# Patient Record
Sex: MALE | Race: BLACK OR AFRICAN AMERICAN
[De-identification: names, ages, dates, MRNs, and addresses within clinical notes are randomized per-mention and may not be internally consistent; named-entity substitution may affect disease eponyms.]

---

## 2017-01-08 ENCOUNTER — HOSPITAL ENCOUNTER (EMERGENCY)
Dept: HOSPITAL 17 - PHEFT | Age: 62
Discharge: HOME | End: 2017-01-08
Payer: MEDICARE

## 2017-01-08 VITALS — WEIGHT: 187.39 LBS | HEIGHT: 66 IN | BODY MASS INDEX: 30.12 KG/M2

## 2017-01-08 VITALS
SYSTOLIC BLOOD PRESSURE: 119 MMHG | TEMPERATURE: 97.9 F | HEART RATE: 60 BPM | OXYGEN SATURATION: 97 % | RESPIRATION RATE: 16 BRPM | DIASTOLIC BLOOD PRESSURE: 85 MMHG

## 2017-01-08 DIAGNOSIS — S00.451A: Primary | ICD-10-CM
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
## 2017-01-08 DIAGNOSIS — F10.10: ICD-10-CM

## 2017-01-08 DIAGNOSIS — Z23: ICD-10-CM

## 2017-01-08 DIAGNOSIS — Z79.01: ICD-10-CM

## 2017-01-08 DIAGNOSIS — F12.20: ICD-10-CM

## 2017-01-08 DIAGNOSIS — I10: ICD-10-CM

## 2017-01-08 DIAGNOSIS — F17.210: ICD-10-CM

## 2017-01-08 DIAGNOSIS — E78.00: ICD-10-CM
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
## 2017-01-08 DIAGNOSIS — E11.9: ICD-10-CM

## 2017-01-08 PROCEDURE — 10120 INC&RMVL FB SUBQ TISS SMPL: CPT

## 2017-01-08 PROCEDURE — 90471 IMMUNIZATION ADMIN: CPT

## 2017-01-08 PROCEDURE — 90714 TD VACC NO PRESV 7 YRS+ IM: CPT

## 2017-01-08 NOTE — PD
HPI


Chief Complaint:  Foreign Body


Time Seen by Provider:  18:24


Travel History


International Travel<30 days:  No


Contact w/Intl Traveler<30days:  No


Traveled to known affect area:  No





History of Present Illness


HPI


Patient's 61-year-old male with a history of diabetes mellitus presents to 

emergency department today with right ear pain for the past few days.  Patient 

states that he wears a hearing in his right ear lobe and that he is unable to 

get the back of the earring out.  His company by his significant other who 

states she is trying to get it out as well and has noticed some foul smell from 

the ears well.  No erythema no fevers and feeling well generally.  Patient 

states this never happened him before.  Denies any nausea vomiting diarrhea 

headache





PFSH


Past Medical History


High Cholesterol:  Yes


Cerebrovascular Accident:  Yes (2001 and 2003)


Diabetes:  Yes


Patient Takes Glucophage:  Yes


Diminished Hearing:  No


GERD:  Yes


Hypertension:  Yes


Seizures:  Yes





Past Surgical History


Other Surgery:  Yes (2001,"left frontal craniotomy")





Social History


Alcohol Use:  Yes (BEER OR WINE COUPLE TIMES/WEEK)


Tobacco Use:  Yes (1/2 PPD)


Substance Use:  Yes (MARIJUANA DAILY)





Allergies-Medications


(Allergen,Severity, Reaction):  


Coded Allergies:  


     No Known Allergies (Verified , 1/8/17)


Reported Meds & Prescriptions





Reported Meds & Active Scripts


Active


Bactrim DS (Sulfamethoxazole-Trimethoprim) 800-160 Mg Tab 2 Tab PO BID 7 Days


Reported


Omeprazole 20 Mg Tab 20 Mg PO DAILY


Lisinopril 10 Mg Tab 10 Mg PO DAILY


Amlodipine (Amlodipine Besylate) 10 Mg Tab 10 Mg PO DAILY


Carvedilol 3.125 Mg Tab 3.125 Mg PO BID


Glipizide 5 Mg Tab 5 Mg PO BIDAC


     Take 30 minutes before a meal


Metformin (Metformin HCl) 1,000 Mg Tab 1,000 Mg PO BIDPC


     With meals


Levetiracetam 750 Mg Tab 750 Mg PO BID








Review of Systems


Except as stated in HPI:  all other systems reviewed are Neg





Physical Exam


Narrative


GENERAL: Well-nourished, well-developed patient in no apparent distress.


SKIN: Warm and dry.


HEAD: Normocephalic.


EYES: No scleral icterus. No injection or drainage. 


ENT: Throat clear oropharynx moist.  TMs clear bilaterally.  Left auricle 

normal.  The right auricle shows some minimal discharge from the posterior 

fenestration of the piercing.  An earring is in place, minimally tender to 

palpation.  No surrounding erythema.  There is some mild edema.  There is some 

small amount of foul-smelling discharge.  The back of the earring appears to be 

just underneath the skin.


NECK: Supple, trachea midline. No JVD or lymphadenopathy.


CARDIOVASCULAR: Regular rate and rhythm without murmurs, gallops, or rubs. 


RESPIRATORY: Breath sounds equal bilaterally. No accessory muscle use.


GASTROINTESTINAL: Abdomen soft, non-tender, nondistended. 


MUSCULOSKELETAL: No cyanosis, or edema. 


BACK: Nontender without obvious deformity. No CVA tenderness.





Data


Data


Last Documented VS





Vital Signs








  Date Time  Temp Pulse Resp B/P Pulse Ox O2 Delivery O2 Flow Rate FiO2


 


1/8/17 18:02 97.9 60 16 119/85 97   








Orders





 Lidocaine 1% Inj (50 Ml) (Xylocaine 1% I (1/8/17 18:15)


Tetanus/Diphtheria Tox Adult (Tetanus/Di (1/8/17 18:45)








MDM


Medical Decision Making


Medical Screen Exam Complete:  Yes


Emergency Medical Condition:  Yes


Differential Diagnosis


Retained foreign body, earlobe infection, abscess unlikely, cellulitis.


Narrative Course


Patient roomed in emergency department, tetanus was updated, foreign body was 

removed with minimal difficulty.  Discussed and medical management home, 

recommend following up with primary care physician.  No earrings until seen by 

primary care physician.  We'll place on antibiotics.





Procedures


**Procedure Narrative**


FOREIGN BODY REMOVAL: After verbal consent, the patient had a partial a regular 

block with 1% lidocaine to a total of 3 cc.  The outer portion of the earring 

was removed, confirming there is still foreign body in his ear lobe with some 

manual expression the earring back was able to be expressed partially, was 

clamped for septum removed.  There was minimal bleeding and minimal trauma to 

the skin.  No further discharge was expressed.  Hemostasis was achieved with 

gentle pressure from a few seconds and blood loss was less than 5 cc.  Patient 

tolerated the procedure well.





Diagnosis





 Primary Impression:  


 Foreign body of skin of ear region


 Qualified Code:  S00.451A - Foreign body of skin of ear region, right, initial 

encounter


***Med/Other Pt SpecificInfo:  Prescription(s) given


Scripts


Sulfamethoxazole-Trimethoprim (Bactrim DS)800-160 Mg Tab2 Tab PO BID  7 Days  

Ref 0


   Prov:Flaquito Jacobson MD         1/8/17


Disposition:  01 DISCHARGE HOME


Condition:  Stable








Flaquito Jacobson MD Jan 8, 2017 18:45

## 2017-12-06 ENCOUNTER — HOSPITAL ENCOUNTER (OUTPATIENT)
Dept: HOSPITAL 17 - HRIP | Age: 62
Discharge: HOME | End: 2017-12-06
Attending: INTERNAL MEDICINE
Payer: MEDICARE

## 2017-12-06 VITALS
HEART RATE: 65 BPM | DIASTOLIC BLOOD PRESSURE: 81 MMHG | SYSTOLIC BLOOD PRESSURE: 119 MMHG | RESPIRATION RATE: 18 BRPM | OXYGEN SATURATION: 93 %

## 2017-12-06 VITALS
SYSTOLIC BLOOD PRESSURE: 122 MMHG | RESPIRATION RATE: 16 BRPM | HEART RATE: 67 BPM | DIASTOLIC BLOOD PRESSURE: 76 MMHG | OXYGEN SATURATION: 94 %

## 2017-12-06 VITALS
SYSTOLIC BLOOD PRESSURE: 128 MMHG | RESPIRATION RATE: 16 BRPM | OXYGEN SATURATION: 99 % | DIASTOLIC BLOOD PRESSURE: 88 MMHG | HEART RATE: 60 BPM

## 2017-12-06 VITALS
OXYGEN SATURATION: 94 % | TEMPERATURE: 98.2 F | DIASTOLIC BLOOD PRESSURE: 91 MMHG | HEART RATE: 68 BPM | RESPIRATION RATE: 20 BRPM | SYSTOLIC BLOOD PRESSURE: 127 MMHG

## 2017-12-06 VITALS
SYSTOLIC BLOOD PRESSURE: 125 MMHG | HEART RATE: 66 BPM | RESPIRATION RATE: 18 BRPM | DIASTOLIC BLOOD PRESSURE: 70 MMHG | OXYGEN SATURATION: 95 %

## 2017-12-06 VITALS
HEART RATE: 69 BPM | DIASTOLIC BLOOD PRESSURE: 75 MMHG | OXYGEN SATURATION: 92 % | SYSTOLIC BLOOD PRESSURE: 113 MMHG | TEMPERATURE: 97.3 F | RESPIRATION RATE: 18 BRPM

## 2017-12-06 VITALS
DIASTOLIC BLOOD PRESSURE: 67 MMHG | OXYGEN SATURATION: 95 % | RESPIRATION RATE: 18 BRPM | HEART RATE: 60 BPM | SYSTOLIC BLOOD PRESSURE: 138 MMHG

## 2017-12-06 VITALS
OXYGEN SATURATION: 94 % | SYSTOLIC BLOOD PRESSURE: 117 MMHG | RESPIRATION RATE: 18 BRPM | DIASTOLIC BLOOD PRESSURE: 78 MMHG | HEART RATE: 81 BPM

## 2017-12-06 VITALS — WEIGHT: 190.48 LBS | HEIGHT: 66 IN | BODY MASS INDEX: 30.61 KG/M2

## 2017-12-06 DIAGNOSIS — K21.9: ICD-10-CM

## 2017-12-06 DIAGNOSIS — I10: ICD-10-CM

## 2017-12-06 DIAGNOSIS — E11.9: ICD-10-CM

## 2017-12-06 DIAGNOSIS — R16.0: Primary | ICD-10-CM

## 2017-12-06 DIAGNOSIS — B19.20: ICD-10-CM

## 2017-12-06 DIAGNOSIS — Z86.73: ICD-10-CM

## 2017-12-06 DIAGNOSIS — K74.60: ICD-10-CM

## 2017-12-06 PROCEDURE — 88342 IMHCHEM/IMCYTCHM 1ST ANTB: CPT

## 2017-12-06 PROCEDURE — 77012 CT SCAN FOR NEEDLE BIOPSY: CPT

## 2017-12-06 PROCEDURE — 88341 IMHCHEM/IMCYTCHM EA ADD ANTB: CPT

## 2017-12-06 PROCEDURE — 88307 TISSUE EXAM BY PATHOLOGIST: CPT

## 2017-12-06 PROCEDURE — 88333 PATH CONSLTJ SURG CYTO XM 1: CPT

## 2017-12-06 PROCEDURE — 47000 NEEDLE BIOPSY OF LIVER PERQ: CPT

## 2017-12-06 NOTE — RADRPT
EXAM DATE/TIME:  12/06/2017 12:03 

 

[facility] COMPARISON:     

No previous studies available for comparison.

 

 

 

INDICATIONS :      

Liver mass.

                     

 

 

 

SEDATION TIME:       

20 minutes

 

 

BIOPSY SITE:       

Right upper quadrant.

                     

 

MEDICATION(S):

1.) 1 mg midazolam (Versed) IV  

2.) 50 mcg fentanyl (Sublimaze) IV  

 

 

DEVICE(S):

1.) 18 gauge Temno core biopsy needle 

                     

 

MEDICAL HISTORY :     

Cirrhosis. Hepatitis C. Gastroesophageal reflux disease. Damon's esophagus, diabetes, hypertension,
 seizures, stroke. 

 

SURGICAL HISTORY :     

None.   

 

ENCOUNTER:     

Initial

 

ACUITY:     

1 day

 

PAIN SCORE:     

0/10

 

LOCATION:     

Right upper quadrant

                     

A total of two core specimen(s) were obtained and sent to the laboratory for pathologic evaluation.

 

 

PROCEDURE:

1.   CT guided liver biopsy.

 

Prior to the procedure informed consent was obtained.  Any appropriate prior imaging studies were rev
iewed. 

Using automated exposure control and adjustment of the mA and/or kV according to patient size, radiat
ion dose was kept as low as reasonably achievable to obtain optimal diagnostic quality images.  DICOM
 format image data is available electronically for review and comparison.   

 

 

The site was prepped in a sterile fashion.  Full sterile technique was used, including cap, mask, susan
rile gloves and gown and a large sterile sheet.  Hand hygiene and 2% chlorhexidine and/or betadine/al
cohol prep was utilized per protocol for cutaneous antisepsis.  The skin and subcutaneous tissues wer
e infiltrated with local anesthetic solution.

 

With CT guidance the previously identified target was localized. Biopsy was performed using the presc
ribed needle as above.  First biopsy was passed to the [employer]. A few abnormal cells were identified 
from the first touch prep. To insure adequate material for diagnosis, a second biopsy was performed a
nd placed directly in formalin. Adequate hemostasis was obtained with compression at the puncture sit
e.

 

Follow-up CT scan reveals no hemorrhage. No pneumothorax.

 

The patient tolerated the procedure well and there were no complications. The patient was returned to
 the Radiology Outpatient Unit in stable condition.

 

CONCLUSION:     Uncomplicated CT guided biopsy.

 

 

 

 Kelechi Stephenson MD on December 06, 2017 at 15:53           

Board Certified Radiologist.

 This report was verified electronically.

## 2017-12-06 NOTE — PD.RAD
Post CT Procedure Prog Note


Pre Procedure Diagnosis:  


(1) Liver mass


Post Procedure Diagnosis:  


(1) Liver mass


Procedure Date:


Dec 6, 2017


Supervising Radiologist:


Kelechi Stephenson


Anesthesia:  Local, Analgesia, Conscious Sedation


Plan of Activity


Patient to Unit:  [facility]


Patient Condition:  Good


See PACS Report for procedural detail/treatment





Biopsy


Imaging Guidance:  CT


Biopsy Procedure:  Liver (dome)


Specimen:  Core Biopsy (x 2)


Findings:


Touch prep on first core.  [employer] described scant cells.  Second bx obtained.











Kelechi Stephenson MD Dec 6, 2017 12:40

## 2017-12-18 ENCOUNTER — HOSPITAL ENCOUNTER (OUTPATIENT)
Dept: HOSPITAL 17 - HROP | Age: 62
Discharge: HOME | End: 2017-12-18
Attending: INTERNAL MEDICINE
Payer: MEDICARE

## 2017-12-18 VITALS
OXYGEN SATURATION: 95 % | SYSTOLIC BLOOD PRESSURE: 144 MMHG | RESPIRATION RATE: 20 BRPM | HEART RATE: 82 BPM | DIASTOLIC BLOOD PRESSURE: 92 MMHG

## 2017-12-18 VITALS
RESPIRATION RATE: 20 BRPM | TEMPERATURE: 97.6 F | SYSTOLIC BLOOD PRESSURE: 132 MMHG | DIASTOLIC BLOOD PRESSURE: 88 MMHG | HEART RATE: 79 BPM | OXYGEN SATURATION: 97 %

## 2017-12-18 VITALS
HEART RATE: 69 BPM | RESPIRATION RATE: 20 BRPM | OXYGEN SATURATION: 98 % | SYSTOLIC BLOOD PRESSURE: 117 MMHG | DIASTOLIC BLOOD PRESSURE: 76 MMHG

## 2017-12-18 VITALS
HEART RATE: 70 BPM | DIASTOLIC BLOOD PRESSURE: 76 MMHG | SYSTOLIC BLOOD PRESSURE: 103 MMHG | RESPIRATION RATE: 20 BRPM | OXYGEN SATURATION: 97 %

## 2017-12-18 VITALS
DIASTOLIC BLOOD PRESSURE: 91 MMHG | TEMPERATURE: 98.2 F | SYSTOLIC BLOOD PRESSURE: 129 MMHG | HEART RATE: 78 BPM | RESPIRATION RATE: 20 BRPM | OXYGEN SATURATION: 95 %

## 2017-12-18 VITALS
HEART RATE: 76 BPM | RESPIRATION RATE: 20 BRPM | DIASTOLIC BLOOD PRESSURE: 75 MMHG | SYSTOLIC BLOOD PRESSURE: 114 MMHG | OXYGEN SATURATION: 96 %

## 2017-12-18 VITALS — WEIGHT: 191.36 LBS | BODY MASS INDEX: 30.75 KG/M2 | HEIGHT: 66 IN

## 2017-12-18 DIAGNOSIS — E78.00: ICD-10-CM

## 2017-12-18 DIAGNOSIS — E11.9: ICD-10-CM

## 2017-12-18 DIAGNOSIS — I10: ICD-10-CM

## 2017-12-18 DIAGNOSIS — Z86.73: ICD-10-CM

## 2017-12-18 DIAGNOSIS — K21.9: ICD-10-CM

## 2017-12-18 DIAGNOSIS — C18.9: Primary | ICD-10-CM

## 2017-12-18 DIAGNOSIS — C78.7: ICD-10-CM

## 2017-12-18 PROCEDURE — 76937 US GUIDE VASCULAR ACCESS: CPT

## 2017-12-18 PROCEDURE — 36561 INSERT TUNNELED CV CATH: CPT

## 2017-12-18 PROCEDURE — 99152 MOD SED SAME PHYS/QHP 5/>YRS: CPT

## 2017-12-18 PROCEDURE — 77001 FLUOROGUIDE FOR VEIN DEVICE: CPT

## 2017-12-18 PROCEDURE — 99153 MOD SED SAME PHYS/QHP EA: CPT

## 2017-12-18 PROCEDURE — C1788 PORT, INDWELLING, IMP: HCPCS

## 2017-12-18 NOTE — PD.RAD
Post Procedure Progress Note


Pre Procedure Diagnosis:  


(1) Liver mass


(2) Colon cancer metastasized to liver


Post Procedure Diagnosis:  


(1) Liver mass


(2) Colon cancer metastasized to liver


Procedure Date:


Dec 18, 2017


Supervising Radiologist:


Kelechi Stephenson


Proceduralist/Assist:  Kenneth Lopez, RT(R), Randee Anderson RT(R)


Anesthesia:  Local, Analgesia, Conscious Sedation


Plan of Activity


Patient to Unit:  [facility]


Patient Condition:  Good


See PACS Report for procedural detail/treatment





Central Venous Access Device


Procedure 1


Right


Internal Jugular


Infusaport


Placement


single lumen


English:  8











Kelechi Stephenson MD Dec 18, 2017 10:43

## 2017-12-18 NOTE — RADRPT
EXAM DATE/TIME:  12/18/2017 09:02 

 

[facility] COMPARISON:  

No previous studies available for comparison.

                     

 

INDICATIONS :               

Patient presents with colon cancer in need of port placement. 

                     

 

MEDICAL HISTORY :     

High Cholesterol

GERD

Diabetes

HTN

Sezure

CVA

 

SURGICAL HISTORY :     

Left Frontal Craneotomy 2001

 

ENCOUNTER:     

Initial

 

ACUITY:     

Subacute

 

PAIN SCORE:     

0/10

                     

 

FLUORO TIME:     

.3 minutes

 

IMAGE SERIES:      

1

 

SEDATION TIME:       

30 minutes

                     

 

ACCESS:     

Right internal jugular vein 

 

SEDATION:      

1.)  3 mg midazolam (Versed)  IV     

2.)  150 mcg fentanyl (Sublimaze)  IV     

      

Prophylactic antibiotics were administered with appropriate pre-procedure timing.     

Vancomycin within 2 hours of procedure, Ancef (or alternative) within 1 hour of procedure.     

      

 

DEVICE:      

1.  8 [place] single lumen  cm [id number]      

 

 

PROCEDURE :     

1.  Continuous pulse oximetry and EKG monitoring.

2.  Intravenous conscious sedation.

3.  Ultrasound guidance for venous access.

4.  Fluoroscopic guided implantable central venous port placement.

 

The patient was placed supine. The neck was prepped in sterile fashion.  Full sterile technique was u
sed, including cap, mask, sterile gloves and gown, and a large sterile sheet.  Hand hygiene and 2% ch
lorhexidine Betadine was utilized per protocol for cutaneous antisepsis with appropriate dry time for
 site.  Sterile gel and sterile probe cover were utilized for ultrasound guidance.   The skin and sub
cutaneous tissues were infiltrated with local anesthetic solution.  

 

Under direct ultrasound guidance, central venous access was accomplished in the targeted vessel.  The
 ultrasound images depicting access guidance were stored and saved to PACS for permanent record.  A s
ubcutaneous pocket was created using blunt dissection.  The port was introduced to the pocket.  The c
atheter tubing was fed through a subcutaneous tunnel to the venotomy site.  The catheter tubing was c
ut to a suitable length and then was introduced through a valved Peel-Away sheath and positioned with
 catheter tubing tip at the cavo-atrial junction level.  The pocket incision was closed with subcutic
ular Vicryl suture.  Steri-Strips were applied.  The port was flushed and locked with heparin solutio
n per protocol.  Sterile dressing was applied to the site.  The patient tolerated the procedure well.


 

Conscious sedation was performed with the prescribed dosages and duration as above in the presence of
 an independent trained radiology nurse to assist in the monitoring of the patient.  EKG and oximetry
 remained stable throughout the procedure. The patient tolerated the procedure well and there were no
 complications. The patient was sent to post anesthesia recovery in stable condition.

 

CONCLUSION:     

Uncomplicated ultrasound and fluoroscopic guided implanted central venous port catheter placement as 
described in detail above.  An 8 [place] Power port was placed.

 

 

 

 Kelechi Stephenson MD on December 18, 2017 at 13:13           

Board Certified Radiologist.

 This report was verified electronically.

## 2018-03-07 ENCOUNTER — HOSPITAL ENCOUNTER (INPATIENT)
Dept: HOSPITAL 17 - HSDI | Age: 63
LOS: 7 days | Discharge: HOME | DRG: 407 | End: 2018-03-14
Attending: SURGERY | Admitting: SURGERY
Payer: MEDICARE

## 2018-03-07 VITALS
TEMPERATURE: 99 F | DIASTOLIC BLOOD PRESSURE: 80 MMHG | SYSTOLIC BLOOD PRESSURE: 141 MMHG | OXYGEN SATURATION: 99 % | HEART RATE: 76 BPM | RESPIRATION RATE: 12 BRPM

## 2018-03-07 VITALS — WEIGHT: 200.62 LBS | BODY MASS INDEX: 32.24 KG/M2 | HEIGHT: 66 IN

## 2018-03-07 VITALS
SYSTOLIC BLOOD PRESSURE: 159 MMHG | DIASTOLIC BLOOD PRESSURE: 90 MMHG | TEMPERATURE: 99.4 F | HEART RATE: 80 BPM | OXYGEN SATURATION: 98 % | RESPIRATION RATE: 13 BRPM

## 2018-03-07 VITALS — HEART RATE: 69 BPM

## 2018-03-07 VITALS — HEART RATE: 76 BPM

## 2018-03-07 VITALS — HEART RATE: 68 BPM

## 2018-03-07 DIAGNOSIS — K21.9: ICD-10-CM

## 2018-03-07 DIAGNOSIS — I12.9: ICD-10-CM

## 2018-03-07 DIAGNOSIS — Z85.038: ICD-10-CM

## 2018-03-07 DIAGNOSIS — R31.9: ICD-10-CM

## 2018-03-07 DIAGNOSIS — F12.90: ICD-10-CM

## 2018-03-07 DIAGNOSIS — F17.210: ICD-10-CM

## 2018-03-07 DIAGNOSIS — R56.9: ICD-10-CM

## 2018-03-07 DIAGNOSIS — E87.6: ICD-10-CM

## 2018-03-07 DIAGNOSIS — E78.5: ICD-10-CM

## 2018-03-07 DIAGNOSIS — B19.20: ICD-10-CM

## 2018-03-07 DIAGNOSIS — Z79.84: ICD-10-CM

## 2018-03-07 DIAGNOSIS — I69.319: ICD-10-CM

## 2018-03-07 DIAGNOSIS — I10: ICD-10-CM

## 2018-03-07 DIAGNOSIS — E11.9: ICD-10-CM

## 2018-03-07 DIAGNOSIS — C78.7: Primary | ICD-10-CM

## 2018-03-07 DIAGNOSIS — K81.1: ICD-10-CM

## 2018-03-07 DIAGNOSIS — N18.9: ICD-10-CM

## 2018-03-07 LAB
ALBUMIN SERPL-MCNC: 3.2 GM/DL (ref 3.4–5)
ALP SERPL-CCNC: 87 U/L (ref 45–117)
ALT SERPL-CCNC: 31 U/L (ref 12–78)
AST SERPL-CCNC: 33 U/L (ref 15–37)
BASOPHILS # BLD AUTO: 0 TH/MM3 (ref 0–0.2)
BASOPHILS NFR BLD: 0.8 % (ref 0–2)
BILIRUB SERPL-MCNC: 0.4 MG/DL (ref 0.2–1)
BUN SERPL-MCNC: 19 MG/DL (ref 7–18)
CALCIUM SERPL-MCNC: 8.7 MG/DL (ref 8.5–10.1)
CHLORIDE SERPL-SCNC: 107 MEQ/L (ref 98–107)
CREAT SERPL-MCNC: 1.39 MG/DL (ref 0.6–1.3)
EOSINOPHIL # BLD: 0.1 TH/MM3 (ref 0–0.4)
EOSINOPHIL NFR BLD: 2 % (ref 0–4)
ERYTHROCYTE [DISTWIDTH] IN BLOOD BY AUTOMATED COUNT: 14.9 % (ref 11.6–17.2)
GFR SERPLBLD BASED ON 1.73 SQ M-ARVRAT: 63 ML/MIN (ref 89–?)
GLUCOSE SERPL-MCNC: 58 MG/DL (ref 74–106)
HCO3 BLD-SCNC: 30.4 MEQ/L (ref 21–32)
HCT VFR BLD CALC: 39.8 % (ref 39–51)
HGB BLD-MCNC: 13.2 GM/DL (ref 13–17)
INR PPP: 1.1 RATIO
LYMPHOCYTES # BLD AUTO: 2.7 TH/MM3 (ref 1–4.8)
LYMPHOCYTES NFR BLD AUTO: 48.2 % (ref 9–44)
MCH RBC QN AUTO: 27.1 PG (ref 27–34)
MCHC RBC AUTO-ENTMCNC: 33.2 % (ref 32–36)
MCV RBC AUTO: 81.8 FL (ref 80–100)
MONOCYTE #: 0.6 TH/MM3 (ref 0–0.9)
MONOCYTES NFR BLD: 10.3 % (ref 0–8)
NEUTROPHILS # BLD AUTO: 2.1 TH/MM3 (ref 1.8–7.7)
NEUTROPHILS NFR BLD AUTO: 38.7 % (ref 16–70)
PLATELET # BLD: 130 TH/MM3 (ref 150–450)
PMV BLD AUTO: 11.2 FL (ref 7–11)
PROT SERPL-MCNC: 7.9 GM/DL (ref 6.4–8.2)
PROTHROMBIN TIME: 11.6 SEC (ref 9.8–11.6)
RBC # BLD AUTO: 4.86 MIL/MM3 (ref 4.5–5.9)
SODIUM SERPL-SCNC: 143 MEQ/L (ref 136–145)
WBC # BLD AUTO: 5.5 TH/MM3 (ref 4–11)

## 2018-03-07 PROCEDURE — 87641 MR-STAPH DNA AMP PROBE: CPT

## 2018-03-07 PROCEDURE — 94150 VITAL CAPACITY TEST: CPT

## 2018-03-07 PROCEDURE — 86901 BLOOD TYPING SEROLOGIC RH(D): CPT

## 2018-03-07 PROCEDURE — 88341 IMHCHEM/IMCYTCHM EA ADD ANTB: CPT

## 2018-03-07 PROCEDURE — 80053 COMPREHEN METABOLIC PANEL: CPT

## 2018-03-07 PROCEDURE — 76937 US GUIDE VASCULAR ACCESS: CPT

## 2018-03-07 PROCEDURE — 86850 RBC ANTIBODY SCREEN: CPT

## 2018-03-07 PROCEDURE — 80076 HEPATIC FUNCTION PANEL: CPT

## 2018-03-07 PROCEDURE — 93005 ELECTROCARDIOGRAM TRACING: CPT

## 2018-03-07 PROCEDURE — 85610 PROTHROMBIN TIME: CPT

## 2018-03-07 PROCEDURE — 71045 X-RAY EXAM CHEST 1 VIEW: CPT

## 2018-03-07 PROCEDURE — 0F510ZZ DESTRUCTION OF RIGHT LOBE LIVER, OPEN APPROACH: ICD-10-PCS | Performed by: SURGERY

## 2018-03-07 PROCEDURE — 86900 BLOOD TYPING SEROLOGIC ABO: CPT

## 2018-03-07 PROCEDURE — C9290 INJ, BUPIVACAINE LIPOSOME: HCPCS

## 2018-03-07 PROCEDURE — 80048 BASIC METABOLIC PNL TOTAL CA: CPT

## 2018-03-07 PROCEDURE — 85025 COMPLETE CBC W/AUTO DIFF WBC: CPT

## 2018-03-07 PROCEDURE — 88307 TISSUE EXAM BY PATHOLOGIST: CPT

## 2018-03-07 PROCEDURE — 84100 ASSAY OF PHOSPHORUS: CPT

## 2018-03-07 PROCEDURE — 86920 COMPATIBILITY TEST SPIN: CPT

## 2018-03-07 PROCEDURE — 82948 REAGENT STRIP/BLOOD GLUCOSE: CPT

## 2018-03-07 PROCEDURE — 88342 IMHCHEM/IMCYTCHM 1ST ANTB: CPT

## 2018-03-07 PROCEDURE — 88304 TISSUE EXAM BY PATHOLOGIST: CPT

## 2018-03-07 PROCEDURE — 85027 COMPLETE CBC AUTOMATED: CPT

## 2018-03-07 PROCEDURE — 85730 THROMBOPLASTIN TIME PARTIAL: CPT

## 2018-03-07 PROCEDURE — 3E0T3BZ INTRODUCTION OF ANESTHETIC AGENT INTO PERIPHERAL NERVES AND PLEXI, PERCUTANEOUS APPROACH: ICD-10-PCS | Performed by: ANESTHESIOLOGY

## 2018-03-07 PROCEDURE — 83735 ASSAY OF MAGNESIUM: CPT

## 2018-03-07 PROCEDURE — 0FB10ZX EXCISION OF RIGHT LOBE LIVER, OPEN APPROACH, DIAGNOSTIC: ICD-10-PCS | Performed by: SURGERY

## 2018-03-07 PROCEDURE — 0WHG0YZ INSERTION OF OTHER DEVICE INTO PERITONEAL CAVITY, OPEN APPROACH: ICD-10-PCS | Performed by: SURGERY

## 2018-03-07 PROCEDURE — 0FT40ZZ RESECTION OF GALLBLADDER, OPEN APPROACH: ICD-10-PCS | Performed by: SURGERY

## 2018-03-07 RX ADMIN — FAMOTIDINE SCH MG: 10 INJECTION, SOLUTION INTRAVENOUS at 20:51

## 2018-03-07 RX ADMIN — Medication SCH ML: at 13:45

## 2018-03-07 RX ADMIN — ATORVASTATIN CALCIUM SCH MG: 20 TABLET, FILM COATED ORAL at 20:50

## 2018-03-07 RX ADMIN — MORPHINE SULFATE SCH MG: 1 INJECTION, SOLUTION INTRAVENOUS at 14:29

## 2018-03-07 RX ADMIN — Medication SCH ML: at 20:51

## 2018-03-07 RX ADMIN — ACETAMINOPHEN SCH MLS/HR: 10 INJECTION, SOLUTION INTRAVENOUS at 15:00

## 2018-03-07 RX ADMIN — LISINOPRIL SCH MG: 10 TABLET ORAL at 20:50

## 2018-03-07 RX ADMIN — PHENYTOIN SODIUM SCH MLS/HR: 50 INJECTION INTRAMUSCULAR; INTRAVENOUS at 14:00

## 2018-03-07 RX ADMIN — CARVEDILOL SCH MG: 3.12 TABLET, FILM COATED ORAL at 20:51

## 2018-03-07 RX ADMIN — ACETAMINOPHEN SCH MLS/HR: 10 INJECTION, SOLUTION INTRAVENOUS at 20:50

## 2018-03-07 RX ADMIN — HYDROCHLOROTHIAZIDE SCH MG: 25 TABLET ORAL at 20:50

## 2018-03-07 NOTE — PD.CONS
[facility]


Service


Critical Care Medicine


Consult Requested By


Dr. Zavala


Reason for Consult


Hemodynamic management and medical management of comorbid conditions


Primary Care Physician


Abad Gonzalez M.D.


History of Present Illness


This is a 62yM with history of remote prior CVAs who presented with right 

hepatic mass which was biopsied and is consistent with primary colon cancer, 

although no primary was identified. He has no evidence of additional disease. 

He presented for planned hepatic resection of the mass. He underwent 

uncomplicated procedure, losing approximately 50cc EBL for which he received 

2400 cc crystalloid. He was extubated and taken to PACU and then to the ICU. I 

evaluated the patient in the ICU. He denied pain and denied other complaints. 

He is still somewhat somnolent from anesthesia, and thus a detailed ROS and 

history is unobtainable. Denies nausea/vomiting or headache. remainder of a 

limited ROS is negative.





Review of Systems


ROS Limitations:  Clinical Condition, Altered Mental Status


Respiratory:  DENIES: Shortness of breath


Cardiovascular:  DENIES: Chest pain


Gastrointestinal:  DENIES: Abdominal pain


ROS


arousing from anesthesia





Past Family Social History


Allergies:  


Coded Allergies:  


     No Known Allergies (Verified  Allergy, Unknown, 3/7/18)


Past Medical History


Hyperlipidemia


CVA in 2001, 2003


GERD


Seizures


DM


Past Surgical History


left frontal craniotomy 2001


Reported Medications


Multiple Vitamin 1 Tab 1 Tab PO DAILY


Lisinopril-Hctz 10-12.5 Mg Tab 1 Tab PO HS


Januvia (Sitagliptin Phosphate) 25 Mg Tab 25 Mg PO BID


Crestor (Rosuvastatin Calcium) 10 Mg Tab 10 Mg PO HS


Omeprazole 20 Mg Tab 20 Mg PO DAILY


Amlodipine (Amlodipine Besylate) 10 Mg Tab 10 Mg PO DAILY


Carvedilol 3.125 Mg Tab 3.125 Mg PO BID


Glipizide 5 Mg Tab 5 Mg PO BIDAC


     Take 30 minutes before a meal


Levetiracetam 750 Mg Tab 750 Mg PO BID


Active Ordered Medications


See MAR


Family History


reviewed and found to be noncontributory to his acute illness


Social History


1/2 ppd smoker, daily MJ use, etoh a few times/wk.





Physical Exam


Vital Signs





Vital Signs








  Date Time  Temp Pulse Resp B/P (MAP) Pulse Ox O2 Delivery O2 Flow Rate FiO2


 


3/7/18 16:00 99.4 80 13 159/90 (113) 98   


 


3/7/18 16:00     99 Nasal Cannula 2.00 


 


3/7/18 16:00  80      


 


3/7/18 14:45 99.9 75 13 146/95 (112) 99 Nasal Cannula 2 


 


3/7/18 14:30  71 13 166/91 (116) 100   


 


3/7/18 14:29   12     


 


3/7/18 14:15  73 19 171/99 (123) 100   


 


3/7/18 14:00  65 18 167/91 (116) 100 Nasal Cannula 2 


 


3/7/18 13:45        


 


3/7/18 13:45  68 20 158/90 (112) 99 Simple Mask 6 


 


3/7/18 13:43 99.9 66 26 149/98 (115) 99 Simple Mask 6 


 


3/7/18 06:59 98.8 67 18 121/85 (97) 99   








Physical Exam


gen: middle-aged male, lying in bed, somnolent, arousing from anesthesia. 


heent: perrl. nc. at. mucous membranes moist.


neck: no jvd. trachea midline. 


chest: left SC introducer sheath in place, site clean and dry. equal chest 

rise. unlabored. nc o2.


cv: normal rate, regular rhythm. sinus by tele.


abd: soft, appropriately tender to palpation. no guarding or rebound. abdominal 

binder in place. single PEDRITO drain with minimal serosanguinous output.


: jacinto in place with pink tinged urine. no clots.


extr: distal pulses 2+. warm, well perfused.


neuro: RASS -1/-2. arousing from anesthesia. moves all extremities. follows 

commands.


Laboratory





Laboratory Tests








Test


  3/7/18


06:50


 


White Blood Count 5.5 


 


Red Blood Count 4.86 


 


Hemoglobin 13.2 


 


Hematocrit 39.8 


 


Mean Corpuscular Volume 81.8 


 


Mean Corpuscular Hemoglobin 27.1 


 


Mean Corpuscular Hemoglobin


Concent 33.2 


 


 


Red Cell Distribution Width 14.9 


 


Platelet Count 130 


 


Mean Platelet Volume 11.2 


 


Neutrophils (%) (Auto) 38.7 


 


Lymphocytes (%) (Auto) 48.2 


 


Monocytes (%) (Auto) 10.3 


 


Eosinophils (%) (Auto) 2.0 


 


Basophils (%) (Auto) 0.8 


 


Neutrophils # (Auto) 2.1 


 


Lymphocytes # (Auto) 2.7 


 


Monocytes # (Auto) 0.6 


 


Eosinophils # (Auto) 0.1 


 


Basophils # (Auto) 0.0 


 


CBC Comment AUTO DIFF 


 


Differential Comment


  AUTO DIFF


CONFIRMED


 


Platelet Estimate LOW 


 


Platelet Morphology Comment ENLARGED 


 


Prothrombin Time 11.6 


 


Prothromb Time International


Ratio 1.1 


 


 


Activated Partial


Thromboplast Time 27.1 


 


 


Blood Urea Nitrogen 19 


 


Creatinine 1.39 


 


Random Glucose 58 


 


Total Protein 7.9 


 


Albumin 3.2 


 


Calcium Level 8.7 


 


Alkaline Phosphatase 87 


 


Aspartate Amino Transf


(AST/SGOT) 33 


 


 


Alanine Aminotransferase


(ALT/SGPT) 31 


 


 


Total Bilirubin 0.4 


 


Sodium Level 143 


 


Potassium Level 3.4 


 


Chloride Level 107 


 


Carbon Dioxide Level 30.4 


 


Anion Gap 6 


 


Estimat Glomerular Filtration


Rate 63 


 








Result Diagram:  


3/7/18 0650                                                                    

            3/7/18 0650








Assessment and Plan


Assessment and Plan


Assessment: 62yM POD 0 s/p right non-anatomic segmentectomy for mass. 

Clinically doing well. Operation is high risk for complication and morbidity 

and I agree with careful monitoring overnight in an ICU setting. close watch of 

uop.





s/p Laparotomy, non-anatomic segmentectomy for mass (segments 7/8) 3/7


- close uop monitoring


- keep large bore central access overnight and arterial line for close 

observation of vitals and ability to resuscitate.


- mivf.


- AM CBC, bmp





HTN


- would aim to keep SBP < 180, would not be aggressive with anti-hypertensives 

given recent liver resection


- prn hydralazine.





Hematuria


- already clearing up. close monitoring.





SCDs


NPO, will allow Dr. Zavala to guide advancement of diet.





Critical care medicine will continue to follow along.


Discussed Condition With


Tim Bourne MD Mar 7, 2018 17:11

## 2018-03-07 NOTE — EKG
Date Performed: 03/07/2018       Time Performed: 06:58:27

 

PTAGE:      62 years

 

EKG:      Sinus rhythm 

 

 NORMAL ECG

 

PREVIOUS TRACING       : 10/09/2015 20.47

 

DOCTOR:   Constantine Gomez  Interpretating Date/Time  03/07/2018 10:21:35

## 2018-03-07 NOTE — MP
cc:

De Edwards MD

****

 

 

DATE OF OPERATION:

03/07/2018

 

PREOPERATIVE DIAGNOSIS:

Metastatic adenocarcinoma, likely colon primary to segment 7 and 8 of 

the liver with unknown primary.

 

POSTOPERATIVE DIAGNOSIS:

Metastatic adenocarcinoma, likely colon primary to segment 7 and 8 of 

the liver with unknown primary.

 

PROCEDURES PERFORMED:

1.  Resection of segment 7 medially and lateral 8 segmentectomy of 

right hepatic lobe for metastatic colorectal cancer.

2.  Cholecystectomy.

3.  Placement of radiologic markers.

4.  Microwave ablation of margins after hepatic resection.

5.  Placement of anti-adhesive Seprafilm.

 

ATTENDING SURGEON:

De Edwards MD

 

ASSISTANT:

Naveen Diggs MD

 

ANESTHESIA:

General and a regional block.

 

ESTIMATED BLOOD LOSS:

50 mL.

 

COMPLICATIONS:

None.

 

FINDINGS:

1.  A 4-5 cm lesion with a small satellite lesion adjacent to it 

medially that was almost coalescent to 1 lesion and segment 7 and 8, 

more grossly in 8; two areas of grossly positive margin posteriorly 

and medially for which microwave ablation was performed at 2 sites to 

ensure complete treatment of tumor.

2.  Significant inflammation and adhesions around the gallbladder, 

although no obvious gallstones consistent with some chronic 

cholecystitis.  Minimal fibrotic change of the liver.

3.  Radiologic clips placed with 4 clips at the edge of the resection 

margin and a double clip at the center portion of resection.

 

INDICATIONS FOR THE PROCEDURE:

The patient is a 59-year-old male who was diagnosed with metastatic 

adenocarcinoma to his right lobe of his liver.  This was felt to be 

colon primary on pathologic evaluation, however, a workup showed no 

primary, which included endoscopy, colonoscopy and PET scan.  Patient 

failed chemotherapy, however, this was his only site of disease and 

resection was reasonable for this patient to make the patient JIMBO.  

The risks, benefits and alternatives to open liver resection and 

possible microwave ablation were discussed with the patient prior to 

the procedure and agreed to undergo the procedure.

 

DESCRIPTION OF THE PROCEDURE:

The patient was taken to the operating room, placed in the supine 

position, placed under general endotracheal anesthesia.  The patient's

abdomen was shaved and prepped.  The regional block was performed by 

anesthesia.  The patient was bumped with the right side up and the 

patient was prepped and draped in a sterile fashion.  Timeout was 

performed.  I made a small incision in the right upper quadrant as 

like a mini-Kocher incision 1 fingerbreadth above the ribs with a 

15-blade scalpel.  I used the Bovie electrocautery and dissected the 

skin and subcutaneous tissue and open the anterior rectus sheath. The 

rectus muscles were divided laterally and spread medially and the 

posterior rectus sheath was opened.  We explored this with a retractor

as well as with our hand.  Tumor in the right  lobe was noted.  There 

was no evidence of any other disease in the abdomen.  No 

carcinomatosis. This appeared resectable based on this evaluation, 

therefore, we opened a full Kocher incision from the midline down to 

the lateral-anterior axillary line.  We placed a Handy extra large 

wound retractor and a Bookwalter retractor.  We took down the 

falciform ligament and mobilized the right lobe of the liver down just

shy of the right hepatic vein.  We opened the lesser sac and placed a 

tourniquet around the tri hepatis.  The patient had significant 

inflammation that was unexpected around the gallbladder.  There was 

really no palpable stones, however, this appeared very abnormal and 

was unexpected and was consistent with a patient with a previous 

cholecystitis.  We did, at this point in time, elected to perform a 

dome-down-type cholecystectomy as patient would likely benefit from 

this; and after the patient had undergone a hepatic resection, this 

would be difficult to treat in the future.  We did use the Bovie 

electrocautery to dissect to the dome down to the infundibulum of the 

gallbladder using the right angle as well as the Bovie electrocautery.

 We easily identified the cystic duct and cystic artery and separately

divided these after tying them with 2-0 silk sutures.  A clip was 

placed on the cystic duct stump.  The gallbladder was passed off for 

permanent processing.  We then turned our attention toward the 

resection.  We were able to pack the liver down into our field and get

excellent exposure of this lesion, which was in the medial part of 7 

and mostly in the segment 8.  Preoperative imaging reviewed and no 

major primary branches of hepatic vein or portal vein were entering 

the or near the lesion.  We used the microwave ablation probe to 

perform some partial ablation 360 degrees on the capsule and deep to 

the capsule to aid in hemostasis for resection.  We then used the wave

harmonic scalpel to divide the parenchyma from the capsule down and 

come down to the base of the lesion.  We did some additional microwave

ablation treatments, again, to the deep margin of the tumor and then 

continued across with the harmonic scalpel completely removing the 

lesion intact. This was marked with a stitch superior at 12 o'clock 

and passed off for permanent processing.  After a careful inspection, 

this was essentially mostly grossly negative margins at the tumor, but

there was a small focal area just a few mm posteriorly as well as 

medially towards the falciform ligament that appeared to have some 

disease.  This was actually cleared with some dissection; however, we 

felt, although we had already ablated there, we would perform a 

dedicated ablation of the posterior and medial margins to ensure 

complete treatment of the tumor.  We did a 3-minute burn posteriorly 

and a 3-minute burn laterally at 100 bradley performing microwave 

ablation of the margins.  At this point in time, we had excellent 

hemostasis.  We removed all of liver packs and all counts were 

correct.  We placed omentum over the resection site after we had 

placed our radiologic clips.  We placed a 19-Amharic round Shaun drain 

up over the dome of the liver through a separate stab incision.  We 

sutured this in place with a nylon suture.  We placed Seprafilm onto 

the Kocker incision. The Seprafilm was placed up over the omentum and 

liver dome and down to the right upper quadrant incision.  We, at this

point in time, turned towards closure.  We ran the posterior rectus 

sheath as well as the most-superior layers lateral to the rectus 

sheath with a #1 single strand PDS.  This gave us excellent closure 

with minimal tension.  We then closed the anterior rectus sheath and 

the most-superficial strongest fascial layer lateral to the rectus 

sheath with a #1 single strand PDS.  We closed the skin with 3-0 

Vicryl deep dermal sutures followed by 4-0 Monocryl and Dermabond.  

The patient's drain was placed to bulb suction.  At this point in 

time, the patient was discontinued from anesthesia and taken to the 

PACU in stable condition.  The patient tolerated the procedure well.  

No apparent complications.  All counts were correct.  I was present 

and scrubbed for the entire procedure.

 

 

 

 

__________________________________

MD JONATHAN Ryan/LULY

D: 03/07/2018, 05:14 PM

T: 03/07/2018, 06:11 PM

Visit #: [id number]

Job #: [id number]

## 2018-03-07 NOTE — RADRPT
EXAM DATE/TIME:  03/07/2018 13:57 

 

[facility] COMPARISON:     

CT NEEDLE BIOPSY LIVER, December 06, 2017, 12:03.

 

                     

INDICATIONS :     

Post central line placement.

                     

 

MEDICAL HISTORY :            

High cholesterol. GERD. Diabetes. Hypertension. Seizure. CVA.   

 

SURGICAL HISTORY :        

Left frontal craniotomy.

 

ENCOUNTER:     

Initial                                        

 

ACUITY:     

1 day      

 

PAIN SCORE:     

Non-responsive.

 

LOCATION:     

Bilateral chest 

 

FINDINGS:     

Central line in good position.  Dewaap-y-Jpsa in good position.  Lungs are clear.  There is no pneumo
thorax.  Drain right upper quadrant

 

CONCLUSION:     I did position without pneumothorax.  

 

 

 Jericho Anne MD FACR on March 07, 2018 at 14:40           

Board Certified Radiologist.

 This report was verified electronically.

## 2018-03-08 VITALS
OXYGEN SATURATION: 98 % | HEART RATE: 74 BPM | SYSTOLIC BLOOD PRESSURE: 111 MMHG | RESPIRATION RATE: 15 BRPM | TEMPERATURE: 98.5 F | DIASTOLIC BLOOD PRESSURE: 82 MMHG

## 2018-03-08 VITALS
SYSTOLIC BLOOD PRESSURE: 119 MMHG | HEART RATE: 80 BPM | DIASTOLIC BLOOD PRESSURE: 80 MMHG | OXYGEN SATURATION: 96 % | RESPIRATION RATE: 16 BRPM | TEMPERATURE: 98.6 F

## 2018-03-08 VITALS
DIASTOLIC BLOOD PRESSURE: 76 MMHG | TEMPERATURE: 98.6 F | RESPIRATION RATE: 14 BRPM | OXYGEN SATURATION: 99 % | SYSTOLIC BLOOD PRESSURE: 115 MMHG | HEART RATE: 71 BPM

## 2018-03-08 VITALS
SYSTOLIC BLOOD PRESSURE: 124 MMHG | TEMPERATURE: 97.7 F | RESPIRATION RATE: 20 BRPM | OXYGEN SATURATION: 98 % | HEART RATE: 91 BPM | DIASTOLIC BLOOD PRESSURE: 85 MMHG

## 2018-03-08 VITALS
DIASTOLIC BLOOD PRESSURE: 69 MMHG | TEMPERATURE: 98.7 F | OXYGEN SATURATION: 98 % | RESPIRATION RATE: 15 BRPM | HEART RATE: 80 BPM | SYSTOLIC BLOOD PRESSURE: 126 MMHG

## 2018-03-08 VITALS
DIASTOLIC BLOOD PRESSURE: 83 MMHG | HEART RATE: 70 BPM | OXYGEN SATURATION: 100 % | SYSTOLIC BLOOD PRESSURE: 121 MMHG | RESPIRATION RATE: 14 BRPM

## 2018-03-08 VITALS — HEART RATE: 71 BPM

## 2018-03-08 VITALS — HEART RATE: 63 BPM

## 2018-03-08 VITALS — SYSTOLIC BLOOD PRESSURE: 121 MMHG

## 2018-03-08 VITALS — OXYGEN SATURATION: 97 %

## 2018-03-08 VITALS — HEART RATE: 70 BPM

## 2018-03-08 VITALS — HEART RATE: 83 BPM

## 2018-03-08 VITALS — HEART RATE: 88 BPM

## 2018-03-08 VITALS — HEART RATE: 81 BPM

## 2018-03-08 VITALS — HEART RATE: 68 BPM

## 2018-03-08 VITALS — HEART RATE: 60 BPM

## 2018-03-08 VITALS — OXYGEN SATURATION: 98 %

## 2018-03-08 LAB
ALBUMIN SERPL-MCNC: 2.5 GM/DL (ref 3.4–5)
ALP SERPL-CCNC: 69 U/L (ref 45–117)
ALT SERPL-CCNC: 447 U/L (ref 12–78)
AST SERPL-CCNC: 1487 U/L (ref 15–37)
BASOPHILS # BLD AUTO: 0 TH/MM3 (ref 0–0.2)
BASOPHILS NFR BLD: 0.5 % (ref 0–2)
BILIRUB INDIRECT SERPL-MCNC: 1.2 MG/DL (ref 0–0.8)
BILIRUB SERPL-MCNC: 5.3 MG/DL (ref 0.2–1)
BUN SERPL-MCNC: 21 MG/DL (ref 7–18)
CALCIUM SERPL-MCNC: 7.6 MG/DL (ref 8.5–10.1)
CHLORIDE SERPL-SCNC: 108 MEQ/L (ref 98–107)
CREAT SERPL-MCNC: 1.37 MG/DL (ref 0.6–1.3)
DIRECT BILIRUBIN ADULT: 4.1 MG/DL (ref 0–0.2)
EOSINOPHIL # BLD: 0 TH/MM3 (ref 0–0.4)
EOSINOPHIL NFR BLD: 0.5 % (ref 0–4)
ERYTHROCYTE [DISTWIDTH] IN BLOOD BY AUTOMATED COUNT: 15.3 % (ref 11.6–17.2)
GFR SERPLBLD BASED ON 1.73 SQ M-ARVRAT: 64 ML/MIN (ref 89–?)
GLUCOSE SERPL-MCNC: 127 MG/DL (ref 74–106)
HCO3 BLD-SCNC: 26.3 MEQ/L (ref 21–32)
HCT VFR BLD CALC: 38.3 % (ref 39–51)
HGB BLD-MCNC: 12.7 GM/DL (ref 13–17)
LYMPHOCYTES # BLD AUTO: 1.4 TH/MM3 (ref 1–4.8)
LYMPHOCYTES NFR BLD AUTO: 18 % (ref 9–44)
MCH RBC QN AUTO: 27.5 PG (ref 27–34)
MCHC RBC AUTO-ENTMCNC: 33 % (ref 32–36)
MCV RBC AUTO: 83.2 FL (ref 80–100)
MONOCYTE #: 0.6 TH/MM3 (ref 0–0.9)
MONOCYTES NFR BLD: 8.1 % (ref 0–8)
NEUTROPHILS # BLD AUTO: 5.8 TH/MM3 (ref 1.8–7.7)
NEUTROPHILS NFR BLD AUTO: 72.9 % (ref 16–70)
PLATELET # BLD: 89 TH/MM3 (ref 150–450)
PMV BLD AUTO: 10.2 FL (ref 7–11)
PROT SERPL-MCNC: 6.3 GM/DL (ref 6.4–8.2)
RBC # BLD AUTO: 4.61 MIL/MM3 (ref 4.5–5.9)
SODIUM SERPL-SCNC: 143 MEQ/L (ref 136–145)
WBC # BLD AUTO: 8 TH/MM3 (ref 4–11)

## 2018-03-08 RX ADMIN — FAMOTIDINE SCH MG: 10 INJECTION, SOLUTION INTRAVENOUS at 11:21

## 2018-03-08 RX ADMIN — ACETAMINOPHEN SCH MLS/HR: 10 INJECTION, SOLUTION INTRAVENOUS at 17:23

## 2018-03-08 RX ADMIN — PHENYTOIN SODIUM SCH MLS/HR: 50 INJECTION INTRAMUSCULAR; INTRAVENOUS at 20:21

## 2018-03-08 RX ADMIN — LISINOPRIL SCH MG: 10 TABLET ORAL at 20:32

## 2018-03-08 RX ADMIN — PHENYTOIN SODIUM SCH MLS/HR: 50 INJECTION INTRAMUSCULAR; INTRAVENOUS at 00:00

## 2018-03-08 RX ADMIN — ATORVASTATIN CALCIUM SCH MG: 20 TABLET, FILM COATED ORAL at 20:32

## 2018-03-08 RX ADMIN — CARVEDILOL SCH MG: 3.12 TABLET, FILM COATED ORAL at 11:35

## 2018-03-08 RX ADMIN — PHENYTOIN SODIUM SCH MLS/HR: 50 INJECTION INTRAMUSCULAR; INTRAVENOUS at 11:18

## 2018-03-08 RX ADMIN — MORPHINE SULFATE SCH MG: 1 INJECTION, SOLUTION INTRAVENOUS at 19:57

## 2018-03-08 RX ADMIN — CARVEDILOL SCH MG: 3.12 TABLET, FILM COATED ORAL at 20:32

## 2018-03-08 RX ADMIN — Medication SCH ML: at 20:33

## 2018-03-08 RX ADMIN — HYDROCHLOROTHIAZIDE SCH MG: 25 TABLET ORAL at 20:32

## 2018-03-08 RX ADMIN — HUMAN INSULIN SCH: 100 INJECTION, SOLUTION SUBCUTANEOUS at 20:49

## 2018-03-08 RX ADMIN — HUMAN INSULIN SCH: 100 INJECTION, SOLUTION SUBCUTANEOUS at 11:35

## 2018-03-08 RX ADMIN — HUMAN INSULIN SCH: 100 INJECTION, SOLUTION SUBCUTANEOUS at 17:00

## 2018-03-08 RX ADMIN — Medication SCH ML: at 11:35

## 2018-03-08 RX ADMIN — ACETAMINOPHEN SCH MLS/HR: 10 INJECTION, SOLUTION INTRAVENOUS at 03:00

## 2018-03-08 RX ADMIN — FAMOTIDINE SCH MG: 10 INJECTION, SOLUTION INTRAVENOUS at 20:32

## 2018-03-08 NOTE — HHI.PR
cc:   De Edwards MD


__________________________________________________





Subjective


Subjective Notes


Up to chair 


Pain controlled 


No complaints





Objective


Vitals/I&O





Vital Signs








  Date Time  Temp Pulse Resp B/P (MAP) Pulse Ox O2 Delivery O2 Flow Rate FiO2


 


3/8/18 12:00     98 Nasal Cannula 2.00 


 


3/8/18 12:00  91      


 


3/8/18 12:00 97.7  20 124/85 (98)    








Labs





Laboratory Tests








Test


  3/7/18


15:30 3/8/18


04:48 3/8/18


11:24


 


Nasal Screen MRSA (PCR)


  MRSA NOT


DETECTED 


  


 


 


White Blood Count  8.0  


 


Red Blood Count  4.61  


 


Hemoglobin  12.7  


 


Hematocrit  38.3  


 


Mean Corpuscular Volume  83.2  


 


Mean Corpuscular Hemoglobin  27.5  


 


Mean Corpuscular Hemoglobin


Concent 


  33.0 


  


 


 


Red Cell Distribution Width  15.3  


 


Platelet Count  89  


 


Mean Platelet Volume  10.2  


 


Neutrophils (%) (Auto)  72.9  


 


Lymphocytes (%) (Auto)  18.0  


 


Monocytes (%) (Auto)  8.1  


 


Eosinophils (%) (Auto)  0.5  


 


Basophils (%) (Auto)  0.5  


 


Neutrophils # (Auto)  5.8  


 


Lymphocytes # (Auto)  1.4  


 


Monocytes # (Auto)  0.6  


 


Eosinophils # (Auto)  0.0  


 


Basophils # (Auto)  0.0  


 


CBC Comment  AUTO DIFF  


 


Differential Comment


  


  AUTO DIFF


CONFIRMED 


 


 


Platelet Estimate  LOW  


 


Platelet Morphology Comment  NORMAL  


 


Red Cell Morphology Comment  NORMAL  


 


Total Bilirubin  5.3  


 


Direct Bilirubin  4.1  


 


Indirect Bilirubin  1.2  


 


Aspartate Amino Transf


(AST/SGOT) 


  1487 


  


 


 


Alanine Aminotransferase


(ALT/SGPT) 


  447 


  


 


 


Alkaline Phosphatase  69  


 


Total Protein  6.3  


 


Albumin  2.5  


 


Blood Urea Nitrogen   21 


 


Creatinine   1.37 


 


Random Glucose   127 


 


Calcium Level   7.6 


 


Sodium Level   143 


 


Potassium Level   3.9 


 


Chloride Level   108 


 


Carbon Dioxide Level   26.3 


 


Anion Gap   9 


 


Estimat Glomerular Filtration


Rate 


  


  64 


 








Cardiovascular:  Regular


Lungs:  Clear


Abdomen:  Other (dressing in place; PEDRITO with SS drainage )


Extremities:  No edema





A/P


Assessment and Plan


62 year old male POD1 liver resection; cholecystectomy for metastatic 

adenocarcinoma 





-Start sips of clear liquids 


-Monitor labs ---repeat in AM


-OOB and mobilize as tolerated


-IS


-IVF


-Pain control


-DC arterial line and CVL----utilize PIVs


-Plan to transfer to [facility] tomorrow





Attending Statement


The exam, history, and the medical decision-making described in the above note 

were completed with the assistance of the mid-level provider. I reviewed and 

agree with the findings presented.  I attest that I had a face-to-face 

encounter with the patient on the same day, and personally performed and 

documented my assessment and findings in the medical record.





patient s/p open liver resection and ablation





abdomen postoperative pain tenderness





HH stable





continue pain control, OOB as soon as possible











Pamela Rainey/First Assist LEO Mar 8, 2018 14:08


De Edwards MD Mar 12, 2018 23:38

## 2018-03-08 NOTE — HHI.CCPN
Subjective


Remarks/Hospital Course


Hospital Course:





This is a 62yM with history of remote prior CVAs who presented with right 

hepatic mass which was biopsied and is consistent with primary colon cancer, 

although no primary was identified. He has no evidence of additional disease. 

He presented for planned hepatic resection of the mass. He underwent 

uncomplicated procedure, losing approximately 50cc EBL for which he received 

2400 cc crystalloid. He was extubated and taken to PACU and then to the ICU. I 

evaluated the patient in the ICU. He denied pain and denied other complaints. 

He is still somewhat somnolent from anesthesia, and thus a detailed ROS and 

history is unobtainable. Denies nausea/vomiting or headache. remainder of a 

limited ROS is negative.





Subjective:





3/8: slightly more painful than yesterday, but states it is adequately 

controlled with PCA. PEDRITO drained 140cc serosanguinous output overnight. 

hemodynamically stable.





Objective





Vital Signs








  Date Time  Temp Pulse Resp B/P (MAP) Pulse Ox O2 Delivery O2 Flow Rate FiO2


 


3/8/18 08:09     97 Nasal Cannula 2.00 


 


3/8/18 06:30  68      


 


3/8/18 04:11   14 121/83 (96)    


 


3/7/18 22:13 99.0       














Intake and Output   


 


 3/8/18 3/8/18 3/9/18





 08:00 16:00 00:00


 


Output Total 600 ml  


 


Balance -600 ml  








Result Diagram:  


3/8/18 0448                                                                    

            3/7/18 0650





Objective Remarks


gen: middle-aged male, lying in bed, awake, alert. 


heent: perrl. nc. at. mucous membranes moist.


neck: no jvd. trachea midline. 


chest: left SC introducer sheath in place, site clean and dry. equal chest 

rise. unlabored. nc o2.


cv: normal rate, regular rhythm. sinus by tele.


abd: soft, appropriately tender to palpation. no guarding or rebound. right 

subcostal incision clean dry. single PEDRITO drain with minimal serosanguinous 

output.


: jacinto in place with urine which is much clearer than yesterday.


extr: distal pulses 2+. warm, well perfused.


neuro: RASS 0. moves all extremities. follows commands.





A/P


Assessment and Plan


Assessment: 62yM POD 1 s/p right non-anatomic segmentectomy for mass. 

Clinically doing well. very slowly advance diet. may transfer out of ICU later 

today.





s/p Laparotomy, non-anatomic segmentectomy for mass (segments 7/8) 3/7


- close uop monitoring


- d/c central line and art line. maintain piv access.


- mivf.


- AM CBC, bmp





HTN


- would aim to keep SBP < 180, would not be aggressive with anti-hypertensives 

given recent liver resection


- prn hydralazine.





Hematuria -resolving.


- already clearing up. close monitoring.





Diabetes


- SSI AC/HS.





SCDs


per Dr. Zavala, advance to clears and advance as tolerated, slowly.





Dispo: if remains stable, can d/c out of ICU later today if tolerating a diet.











Tim Alva MD Mar 8, 2018 09:42

## 2018-03-09 VITALS
HEART RATE: 81 BPM | SYSTOLIC BLOOD PRESSURE: 105 MMHG | DIASTOLIC BLOOD PRESSURE: 70 MMHG | TEMPERATURE: 99.6 F | RESPIRATION RATE: 10 BRPM | OXYGEN SATURATION: 95 %

## 2018-03-09 VITALS
RESPIRATION RATE: 10 BRPM | SYSTOLIC BLOOD PRESSURE: 103 MMHG | OXYGEN SATURATION: 97 % | TEMPERATURE: 99.6 F | HEART RATE: 77 BPM | DIASTOLIC BLOOD PRESSURE: 69 MMHG

## 2018-03-09 VITALS
TEMPERATURE: 98.8 F | DIASTOLIC BLOOD PRESSURE: 84 MMHG | RESPIRATION RATE: 15 BRPM | HEART RATE: 83 BPM | OXYGEN SATURATION: 91 % | SYSTOLIC BLOOD PRESSURE: 126 MMHG

## 2018-03-09 VITALS — HEART RATE: 88 BPM

## 2018-03-09 VITALS
RESPIRATION RATE: 14 BRPM | HEART RATE: 82 BPM | OXYGEN SATURATION: 96 % | DIASTOLIC BLOOD PRESSURE: 82 MMHG | SYSTOLIC BLOOD PRESSURE: 121 MMHG | TEMPERATURE: 98.9 F

## 2018-03-09 VITALS
DIASTOLIC BLOOD PRESSURE: 91 MMHG | RESPIRATION RATE: 20 BRPM | SYSTOLIC BLOOD PRESSURE: 114 MMHG | OXYGEN SATURATION: 96 % | HEART RATE: 80 BPM | TEMPERATURE: 99.8 F

## 2018-03-09 VITALS
SYSTOLIC BLOOD PRESSURE: 130 MMHG | TEMPERATURE: 99 F | RESPIRATION RATE: 15 BRPM | HEART RATE: 83 BPM | DIASTOLIC BLOOD PRESSURE: 80 MMHG | OXYGEN SATURATION: 97 %

## 2018-03-09 VITALS — HEART RATE: 82 BPM

## 2018-03-09 LAB
ALBUMIN SERPL-MCNC: 2.5 GM/DL (ref 3.4–5)
ALP SERPL-CCNC: 65 U/L (ref 45–117)
ALT SERPL-CCNC: 415 U/L (ref 12–78)
AST SERPL-CCNC: 781 U/L (ref 15–37)
BASOPHILS # BLD AUTO: 0 TH/MM3 (ref 0–0.2)
BASOPHILS NFR BLD: 0.3 % (ref 0–2)
BILIRUB SERPL-MCNC: 2.1 MG/DL (ref 0.2–1)
BUN SERPL-MCNC: 28 MG/DL (ref 7–18)
CALCIUM SERPL-MCNC: 6.9 MG/DL (ref 8.5–10.1)
CALCIUM TP COR SERPL-MCNC: 7.5 MG/DL (ref 8.5–10.1)
CHLORIDE SERPL-SCNC: 109 MEQ/L (ref 98–107)
CREAT SERPL-MCNC: 1.51 MG/DL (ref 0.6–1.3)
EOSINOPHIL # BLD: 0 TH/MM3 (ref 0–0.4)
EOSINOPHIL NFR BLD: 0.3 % (ref 0–4)
ERYTHROCYTE [DISTWIDTH] IN BLOOD BY AUTOMATED COUNT: 15.1 % (ref 11.6–17.2)
GFR SERPLBLD BASED ON 1.73 SQ M-ARVRAT: 57 ML/MIN (ref 89–?)
GLUCOSE SERPL-MCNC: 127 MG/DL (ref 74–106)
HCO3 BLD-SCNC: 28.4 MEQ/L (ref 21–32)
HCT VFR BLD CALC: 33.6 % (ref 39–51)
HGB BLD-MCNC: 11 GM/DL (ref 13–17)
LYMPHOCYTES # BLD AUTO: 1.4 TH/MM3 (ref 1–4.8)
LYMPHOCYTES NFR BLD AUTO: 18.5 % (ref 9–44)
MAGNESIUM SERPL-MCNC: 1.6 MG/DL (ref 1.5–2.5)
MCH RBC QN AUTO: 27.2 PG (ref 27–34)
MCHC RBC AUTO-ENTMCNC: 32.7 % (ref 32–36)
MCV RBC AUTO: 83 FL (ref 80–100)
MONOCYTE #: 0.8 TH/MM3 (ref 0–0.9)
MONOCYTES NFR BLD: 10.3 % (ref 0–8)
NEUTROPHILS # BLD AUTO: 5.2 TH/MM3 (ref 1.8–7.7)
NEUTROPHILS NFR BLD AUTO: 70.6 % (ref 16–70)
PHOSPHATE SERPL-MCNC: 3 MG/DL (ref 2.5–4.9)
PLATELET # BLD: 68 TH/MM3 (ref 150–450)
PMV BLD AUTO: 10.6 FL (ref 7–11)
PROT SERPL-MCNC: 6 GM/DL (ref 6.4–8.2)
RBC # BLD AUTO: 4.04 MIL/MM3 (ref 4.5–5.9)
SODIUM SERPL-SCNC: 143 MEQ/L (ref 136–145)
WBC # BLD AUTO: 7.4 TH/MM3 (ref 4–11)

## 2018-03-09 RX ADMIN — ATORVASTATIN CALCIUM SCH MG: 20 TABLET, FILM COATED ORAL at 20:34

## 2018-03-09 RX ADMIN — FAMOTIDINE SCH MG: 10 INJECTION, SOLUTION INTRAVENOUS at 20:33

## 2018-03-09 RX ADMIN — CARVEDILOL SCH MG: 3.12 TABLET, FILM COATED ORAL at 20:32

## 2018-03-09 RX ADMIN — HYDROCHLOROTHIAZIDE SCH MG: 25 TABLET ORAL at 20:32

## 2018-03-09 RX ADMIN — HUMAN INSULIN SCH: 100 INJECTION, SOLUTION SUBCUTANEOUS at 21:00

## 2018-03-09 RX ADMIN — HUMAN INSULIN SCH: 100 INJECTION, SOLUTION SUBCUTANEOUS at 12:00

## 2018-03-09 RX ADMIN — HUMAN INSULIN SCH: 100 INJECTION, SOLUTION SUBCUTANEOUS at 08:00

## 2018-03-09 RX ADMIN — HUMAN INSULIN SCH: 100 INJECTION, SOLUTION SUBCUTANEOUS at 17:00

## 2018-03-09 RX ADMIN — Medication SCH ML: at 20:33

## 2018-03-09 RX ADMIN — LISINOPRIL SCH MG: 10 TABLET ORAL at 20:33

## 2018-03-09 RX ADMIN — PHENYTOIN SODIUM SCH MLS/HR: 50 INJECTION INTRAMUSCULAR; INTRAVENOUS at 16:00

## 2018-03-09 RX ADMIN — CARVEDILOL SCH MG: 3.12 TABLET, FILM COATED ORAL at 09:23

## 2018-03-09 RX ADMIN — Medication SCH ML: at 09:00

## 2018-03-09 RX ADMIN — HUMAN INSULIN SCH: 100 INJECTION, SOLUTION SUBCUTANEOUS at 03:00

## 2018-03-09 RX ADMIN — PHENYTOIN SODIUM SCH MLS/HR: 50 INJECTION INTRAMUSCULAR; INTRAVENOUS at 05:53

## 2018-03-09 RX ADMIN — FAMOTIDINE SCH MG: 10 INJECTION, SOLUTION INTRAVENOUS at 09:23

## 2018-03-09 NOTE — HHI.PR
__________________________________________________





Subjective


Subjective Notes


Resting in bed


Pain controlled


Tolerating clear liquids





Objective


Vitals/I&O





Vital Signs








  Date Time  Temp Pulse Resp B/P (MAP) Pulse Ox O2 Delivery O2 Flow Rate FiO2


 


3/9/18 08:00 98.8 83 15 126/84 (98) 91   


 


3/8/18 20:32      Nasal Cannula 22.00 








Labs





Laboratory Tests








Test


  3/8/18


11:24 3/9/18


03:33


 


Blood Urea Nitrogen 21  28 


 


Creatinine 1.37  1.51 


 


Random Glucose 127  127 


 


Calcium Level 7.6  6.9 


 


Sodium Level 143  143 


 


Potassium Level 3.9  4.0 


 


Chloride Level 108  109 


 


Carbon Dioxide Level 26.3  28.4 


 


Anion Gap 9  6 


 


Estimat Glomerular Filtration


Rate 64 


  57 


 


 


White Blood Count  7.4 


 


Red Blood Count  4.04 


 


Hemoglobin  11.0 


 


Hematocrit  33.6 


 


Mean Corpuscular Volume  83.0 


 


Mean Corpuscular Hemoglobin  27.2 


 


Mean Corpuscular Hemoglobin


Concent 


  32.7 


 


 


Red Cell Distribution Width  15.1 


 


Platelet Count  68 


 


Mean Platelet Volume  10.6 


 


Neutrophils (%) (Auto)  70.6 


 


Lymphocytes (%) (Auto)  18.5 


 


Monocytes (%) (Auto)  10.3 


 


Eosinophils (%) (Auto)  0.3 


 


Basophils (%) (Auto)  0.3 


 


Neutrophils # (Auto)  5.2 


 


Lymphocytes # (Auto)  1.4 


 


Monocytes # (Auto)  0.8 


 


Eosinophils # (Auto)  0.0 


 


Basophils # (Auto)  0.0 


 


CBC Comment  AUTO DIFF 


 


Differential Comment


  


  AUTO DIFF


CONFIRMED


 


Platelet Estimate  LOW 


 


Platelet Morphology Comment  NORMAL 


 


Total Protein  6.0 


 


Albumin  2.5 


 


Phosphorus Level  3.0 


 


Magnesium Level  1.6 


 


Alkaline Phosphatase  65 


 


Aspartate Amino Transf


(AST/SGOT) 


  781 


 


 


Alanine Aminotransferase


(ALT/SGPT) 


  415 


 


 


Total Bilirubin  2.1 


 


Protein Corrected Calcium  7.5 








Cardiovascular:  Regular


Lungs:  Clear


Abdomen:  Other (RUQ incision c/d/i; PEDRITO with minimal serous drainage )


Extremities:  No edema





A/P


Assessment and Plan


62 year old male POD2 liver resection; cholecystectomy for metastatic 

adenocarcinoma 





-Continue sips of clear liquids 


-Monitor labs --liver enzymes trending down 


-OOB and mobilize as tolerated


-IS


-IVF


-Pain control


-Transfer out of [facility]





Attending Statement


The exam, history, and the medical decision-making described in the above note 

were completed with the assistance of the mid-level provider. I reviewed and 

agree with the findings presented.  I attest that I had a face-to-face 

encounter with the patient on the same day, and personally performed and 

documented my assessment and findings in the medical record.





patient s/p open liver resection





abdomen exam postoperative tenderness





PEDRITO ok





advance diet











Pamela Rainey ARNP/First Assist ARNP Mar 9, 2018 10:12


De Edwards MD Mar 12, 2018 23:40

## 2018-03-09 NOTE — HHI.PR
Subjective


Remarks


awake and alert


denies any nausea or vomiting


some abdominal discomfort


jacinto in place





Objective


Vitals





Vital Signs








  Date Time  Temp Pulse Resp B/P (MAP) Pulse Ox O2 Delivery O2 Flow Rate FiO2


 


3/9/18 16:00 99.0 83 15 130/80 (97) 97   


 


3/9/18 12:00 98.9 82 14 121/82 (95) 96   


 


3/9/18 08:00 98.8 83 15 126/84 (98) 91   


 


3/9/18 05:56   17     


 


3/9/18 04:00 99.6 77 10 103/69 (80) 97   


 


3/9/18 00:00 99.6 81 10 105/70 (82) 95   


 


3/8/18 22:00  81      


 


3/8/18 21:56 98.7 80 15 126/69 (88) 98   


 


3/8/18 20:49   10     


 


3/8/18 20:32     98 Nasal Cannula 22.00 


 


3/8/18 20:00  74      


 


3/8/18 20:00 98.5 74 15 111/82 (92) 98   


 


3/8/18 19:57   14     


 


3/8/18 19:00     98 Nasal Cannula 2.00 


 


3/8/18 18:34   15     


 


3/8/18 18:00  70      














I/O      


 


 3/8/18 3/8/18 3/8/18 3/9/18 3/9/18 3/9/18





 07:00 15:00 23:00 07:00 15:00 23:00


 


Intake Total   420 ml  420 ml 


 


Output Total 600 ml  405 ml  350 ml 


 


Balance -600 ml  15 ml  70 ml 


 


      


 


Intake Oral   320 ml  420 ml 


 


IV Total   100 ml   


 


Output Urine Total 600 ml  325 ml  350 ml 


 


Stool Total 0 ml     


 


Drainage Total 0 ml  80 ml   








Result Diagram:  


3/9/18 0333                                                                    

            3/9/18 0333





Imaging





Last Impressions








Chest X-Ray 3/7/18 0000 Signed





Impressions: 





 Service Date/Time:  Wednesday, March 7, 2018 13:57 - CONCLUSION: I did 

position 





 without pneumothorax.      Jericho Anne MD  FACR








Objective Remarks


awake and alert, no acute distress


anicteric


lungs- clear


regular rhythm


abdomen- slightly distended- right quadrant in post op dressing, tube in place


jacinto in place


extremities no edema





A/P


Assessment and Plan


62 years old male





s/p Laparotomy, non-anatomic segmentectomy for mass (segments 7/8) 3/7


- GS ff





HTN


- would aim to keep SBP < 180, would not be aggressive with anti-hypertensives 

given recent liver resection


- on multiple meds- BB, ACE, HCTZ, Amlodipine


- monitor





Hematuria -resolving.


- - resolving





Diabetes Mellitus


- SSI AC/HS. for now-  readings- 120-170s


- hold home oral meds- On Januvia and glipizide as OP





Chronic kidney insufficiency


-non oliguric. FF BMP





History of SZ/CVA


- continue on Keppra








per Dr. Zavala, advance to clears and advance as tolerated, slowly.





CM for DC planning assistance











Rohith Moreno MD Mar 9, 2018 17:36

## 2018-03-10 VITALS
RESPIRATION RATE: 18 BRPM | DIASTOLIC BLOOD PRESSURE: 83 MMHG | OXYGEN SATURATION: 95 % | HEART RATE: 97 BPM | TEMPERATURE: 100 F | SYSTOLIC BLOOD PRESSURE: 157 MMHG

## 2018-03-10 VITALS
HEART RATE: 89 BPM | SYSTOLIC BLOOD PRESSURE: 105 MMHG | OXYGEN SATURATION: 94 % | TEMPERATURE: 99.1 F | DIASTOLIC BLOOD PRESSURE: 77 MMHG | RESPIRATION RATE: 17 BRPM

## 2018-03-10 VITALS
DIASTOLIC BLOOD PRESSURE: 90 MMHG | TEMPERATURE: 100 F | OXYGEN SATURATION: 97 % | SYSTOLIC BLOOD PRESSURE: 156 MMHG | HEART RATE: 99 BPM | RESPIRATION RATE: 17 BRPM

## 2018-03-10 VITALS
OXYGEN SATURATION: 96 % | HEART RATE: 86 BPM | DIASTOLIC BLOOD PRESSURE: 80 MMHG | SYSTOLIC BLOOD PRESSURE: 125 MMHG | TEMPERATURE: 96 F | RESPIRATION RATE: 17 BRPM

## 2018-03-10 VITALS
RESPIRATION RATE: 18 BRPM | OXYGEN SATURATION: 95 % | SYSTOLIC BLOOD PRESSURE: 107 MMHG | TEMPERATURE: 100.5 F | HEART RATE: 92 BPM | DIASTOLIC BLOOD PRESSURE: 77 MMHG

## 2018-03-10 VITALS
HEART RATE: 89 BPM | DIASTOLIC BLOOD PRESSURE: 77 MMHG | TEMPERATURE: 98.4 F | RESPIRATION RATE: 18 BRPM | OXYGEN SATURATION: 98 % | SYSTOLIC BLOOD PRESSURE: 115 MMHG

## 2018-03-10 RX ADMIN — CARVEDILOL SCH MG: 3.12 TABLET, FILM COATED ORAL at 19:52

## 2018-03-10 RX ADMIN — PHENYTOIN SODIUM SCH MLS/HR: 50 INJECTION INTRAMUSCULAR; INTRAVENOUS at 11:31

## 2018-03-10 RX ADMIN — HUMAN INSULIN SCH: 100 INJECTION, SOLUTION SUBCUTANEOUS at 03:00

## 2018-03-10 RX ADMIN — Medication SCH ML: at 08:37

## 2018-03-10 RX ADMIN — CARVEDILOL SCH MG: 3.12 TABLET, FILM COATED ORAL at 08:38

## 2018-03-10 RX ADMIN — ATORVASTATIN CALCIUM SCH MG: 20 TABLET, FILM COATED ORAL at 19:52

## 2018-03-10 RX ADMIN — PHENYTOIN SODIUM SCH MLS/HR: 50 INJECTION INTRAMUSCULAR; INTRAVENOUS at 21:16

## 2018-03-10 RX ADMIN — HUMAN INSULIN SCH: 100 INJECTION, SOLUTION SUBCUTANEOUS at 07:38

## 2018-03-10 RX ADMIN — FAMOTIDINE SCH MG: 10 INJECTION, SOLUTION INTRAVENOUS at 08:37

## 2018-03-10 RX ADMIN — HUMAN INSULIN SCH: 100 INJECTION, SOLUTION SUBCUTANEOUS at 21:15

## 2018-03-10 RX ADMIN — HUMAN INSULIN SCH: 100 INJECTION, SOLUTION SUBCUTANEOUS at 16:17

## 2018-03-10 RX ADMIN — HUMAN INSULIN SCH: 100 INJECTION, SOLUTION SUBCUTANEOUS at 11:29

## 2018-03-10 RX ADMIN — PHENYTOIN SODIUM SCH MLS/HR: 50 INJECTION INTRAMUSCULAR; INTRAVENOUS at 01:48

## 2018-03-10 RX ADMIN — MORPHINE SULFATE SCH MG: 1 INJECTION, SOLUTION INTRAVENOUS at 01:16

## 2018-03-10 RX ADMIN — FAMOTIDINE SCH MG: 10 INJECTION, SOLUTION INTRAVENOUS at 19:53

## 2018-03-10 RX ADMIN — Medication SCH ML: at 19:54

## 2018-03-10 NOTE — HHI.PR
__________________________________________________





Subjective


Subjective Notes


feels well, still significant pain





Objective


Vitals/I&O





Vital Signs








  Date Time  Temp Pulse Resp B/P (MAP) Pulse Ox O2 Delivery O2 Flow Rate FiO2


 


3/10/18 08:00 99.1 89 17 105/77 (86) 94   


 


3/9/18 20:00      Nasal Cannula 2.00 








Cardiovascular:  Regular


Lungs:  Clear


Abdomen:  Non-distended, Post-op tenderness


Extremities:  No edema, Perfused





A/P


Assessment and Plan


63yo male s/p open liver resection, stable.  pain still an issue, advance diet, 

OOB.











De Edwards MD Mar 10, 2018 13:33

## 2018-03-10 NOTE — HHI.PR
Subjective


Remarks


awake and alert


denies any pain


+ lots of flatus, voiding


tolerating clears





Objective


Vitals





Vital Signs








  Date Time  Temp Pulse Resp B/P (MAP) Pulse Ox O2 Delivery O2 Flow Rate FiO2


 


3/10/18 08:00 99.1 89 17 105/77 (86) 94   


 


3/10/18 05:41   19     


 


3/10/18 04:00 98.4 89 18 115/77 (90) 98   


 


3/10/18 01:16   18     


 


3/10/18 00:04 100.5 92 18 107/77 (87) 95   


 


3/9/18 22:00   19     


 


3/9/18 20:00     96 Nasal Cannula 2.00 


 


3/9/18 20:00 99.8 80 20 114/91 (99) 96   


 


3/9/18 18:00  82      


 


3/9/18 16:00 99.0 83 15 130/80 (97) 97   


 


3/9/18 15:00     99 Nasal Cannula 2.00 


 


3/9/18 14:00  88      














I/O      


 


 3/9/18 3/9/18 3/9/18 3/10/18 3/10/18 3/10/18





 07:00 15:00 23:00 07:00 15:00 23:00


 


Intake Total  420 ml 1366 ml 623.3 ml  


 


Output Total  380 ml 1150 ml 775 ml  


 


Balance  40 ml 216 ml -151.7 ml  


 


      


 


Intake Oral  420 ml 240 ml 240 ml  


 


IV Total   1126 ml 383.3 ml  


 


Output Urine Total  350 ml 1100 ml 700 ml  


 


Drainage Total  30 ml 50 ml 75 ml  


 


# Bowel Movements   0 0  








Result Diagram:  


3/9/18 0333                                                                    

            3/9/18 0333





Imaging





Last Impressions








Chest X-Ray 3/7/18 0000 Signed





Impressions: 





 Service Date/Time:  Wednesday, March 7, 2018 13:57 - CONCLUSION: I did 

position 





 without pneumothorax.      Jericho Anne MD  FACR








Objective Remarks


awake and alert, no acute distress


anicteric


lungs- clear


regular rhythm


abdomen- slightly distended- right quadrant in post op dressing, tube in place


extremities no edema





A/P


Assessment and Plan


62 years old male





s/p Laparotomy, non-anatomic segmentectomy for mass (segments 7/8) 3/7


- GS ff


- start diet- regular 





HTN


- on multiple meds-


- Hold HCTZ and Lisinopril- with increase in creatinine


- borderline BP. Hold amlodpine as well 


- continue on IVF monitor


- continue on coreg for now - 





Hematuria -- -resolved





Diabetes Mellitus


- SSI AC/HS. for now-  readings- 120-170s


- hold home oral meds- On Januvia and glipizide as OP


- monitor- start diet 





Chronic kidney insufficiency


-non oliguric. FF BMP


- continue IVF





History of SZ/CVA


- continue on Keppra








Advance diet today


Out of bed to chair for meals 


PT daily








CM for DC planning assistance











Rohith Moreno MD Mar 10, 2018 13:14

## 2018-03-11 VITALS
OXYGEN SATURATION: 97 % | RESPIRATION RATE: 19 BRPM | DIASTOLIC BLOOD PRESSURE: 89 MMHG | SYSTOLIC BLOOD PRESSURE: 133 MMHG | HEART RATE: 96 BPM | TEMPERATURE: 98 F

## 2018-03-11 VITALS
DIASTOLIC BLOOD PRESSURE: 92 MMHG | OXYGEN SATURATION: 95 % | HEART RATE: 93 BPM | RESPIRATION RATE: 20 BRPM | TEMPERATURE: 98.6 F | SYSTOLIC BLOOD PRESSURE: 155 MMHG

## 2018-03-11 VITALS
HEART RATE: 93 BPM | OXYGEN SATURATION: 97 % | TEMPERATURE: 99.3 F | SYSTOLIC BLOOD PRESSURE: 154 MMHG | DIASTOLIC BLOOD PRESSURE: 82 MMHG | RESPIRATION RATE: 19 BRPM

## 2018-03-11 VITALS
RESPIRATION RATE: 18 BRPM | OXYGEN SATURATION: 97 % | SYSTOLIC BLOOD PRESSURE: 135 MMHG | TEMPERATURE: 98.5 F | DIASTOLIC BLOOD PRESSURE: 86 MMHG | HEART RATE: 91 BPM

## 2018-03-11 VITALS
RESPIRATION RATE: 22 BRPM | SYSTOLIC BLOOD PRESSURE: 127 MMHG | OXYGEN SATURATION: 97 % | DIASTOLIC BLOOD PRESSURE: 88 MMHG | TEMPERATURE: 99.7 F | HEART RATE: 88 BPM

## 2018-03-11 VITALS
SYSTOLIC BLOOD PRESSURE: 138 MMHG | HEART RATE: 90 BPM | RESPIRATION RATE: 18 BRPM | DIASTOLIC BLOOD PRESSURE: 91 MMHG | OXYGEN SATURATION: 95 % | TEMPERATURE: 100.8 F

## 2018-03-11 LAB
ALBUMIN SERPL-MCNC: 2.6 GM/DL (ref 3.4–5)
ALP SERPL-CCNC: 80 U/L (ref 45–117)
ALT SERPL-CCNC: 205 U/L (ref 12–78)
AST SERPL-CCNC: 222 U/L (ref 15–37)
BILIRUB SERPL-MCNC: 1.9 MG/DL (ref 0.2–1)
BUN SERPL-MCNC: 19 MG/DL (ref 7–18)
CALCIUM SERPL-MCNC: 8 MG/DL (ref 8.5–10.1)
CHLORIDE SERPL-SCNC: 106 MEQ/L (ref 98–107)
CREAT SERPL-MCNC: 1.14 MG/DL (ref 0.6–1.3)
ERYTHROCYTE [DISTWIDTH] IN BLOOD BY AUTOMATED COUNT: 15 % (ref 11.6–17.2)
GFR SERPLBLD BASED ON 1.73 SQ M-ARVRAT: 79 ML/MIN (ref 89–?)
GLUCOSE SERPL-MCNC: 100 MG/DL (ref 74–106)
HCO3 BLD-SCNC: 25.5 MEQ/L (ref 21–32)
HCT VFR BLD CALC: 35.1 % (ref 39–51)
HGB BLD-MCNC: 11.6 GM/DL (ref 13–17)
MCH RBC QN AUTO: 27.4 PG (ref 27–34)
MCHC RBC AUTO-ENTMCNC: 33.2 % (ref 32–36)
MCV RBC AUTO: 82.5 FL (ref 80–100)
PLATELET # BLD: 101 TH/MM3 (ref 150–450)
PMV BLD AUTO: 10.1 FL (ref 7–11)
PROT SERPL-MCNC: 6.6 GM/DL (ref 6.4–8.2)
RBC # BLD AUTO: 4.25 MIL/MM3 (ref 4.5–5.9)
SODIUM SERPL-SCNC: 140 MEQ/L (ref 136–145)
WBC # BLD AUTO: 7.7 TH/MM3 (ref 4–11)

## 2018-03-11 RX ADMIN — CARVEDILOL SCH MG: 3.12 TABLET, FILM COATED ORAL at 19:39

## 2018-03-11 RX ADMIN — HUMAN INSULIN SCH: 100 INJECTION, SOLUTION SUBCUTANEOUS at 21:00

## 2018-03-11 RX ADMIN — CARVEDILOL SCH MG: 3.12 TABLET, FILM COATED ORAL at 08:06

## 2018-03-11 RX ADMIN — Medication SCH ML: at 08:06

## 2018-03-11 RX ADMIN — ATORVASTATIN CALCIUM SCH MG: 20 TABLET, FILM COATED ORAL at 19:40

## 2018-03-11 RX ADMIN — PHENYTOIN SODIUM SCH MLS/HR: 50 INJECTION INTRAMUSCULAR; INTRAVENOUS at 08:06

## 2018-03-11 RX ADMIN — Medication SCH ML: at 19:39

## 2018-03-11 RX ADMIN — PHENYTOIN SODIUM SCH MLS/HR: 50 INJECTION INTRAMUSCULAR; INTRAVENOUS at 16:30

## 2018-03-11 RX ADMIN — FAMOTIDINE SCH MG: 10 INJECTION, SOLUTION INTRAVENOUS at 19:40

## 2018-03-11 RX ADMIN — HUMAN INSULIN SCH: 100 INJECTION, SOLUTION SUBCUTANEOUS at 03:00

## 2018-03-11 RX ADMIN — HUMAN INSULIN SCH: 100 INJECTION, SOLUTION SUBCUTANEOUS at 16:30

## 2018-03-11 RX ADMIN — HUMAN INSULIN SCH: 100 INJECTION, SOLUTION SUBCUTANEOUS at 07:34

## 2018-03-11 RX ADMIN — FAMOTIDINE SCH MG: 10 INJECTION, SOLUTION INTRAVENOUS at 08:06

## 2018-03-11 RX ADMIN — HUMAN INSULIN SCH: 100 INJECTION, SOLUTION SUBCUTANEOUS at 11:39

## 2018-03-11 NOTE — HHI.PR
__________________________________________________





Subjective


Subjective Notes


no acute issues, abd pain, +flatus, tolerating diet





Objective


Vitals/I&O





Vital Signs








  Date Time  Temp Pulse Resp B/P (MAP) Pulse Ox O2 Delivery O2 Flow Rate FiO2


 


3/11/18 08:08      Room Air  


 


3/11/18 07:49 99.3 93 19 154/82 (106) 97   


 


3/9/18 20:00       2.00 








Labs





Laboratory Tests








Test


  3/11/18


06:12


 


White Blood Count 7.7 


 


Red Blood Count 4.25 


 


Hemoglobin 11.6 


 


Hematocrit 35.1 


 


Mean Corpuscular Volume 82.5 


 


Mean Corpuscular Hemoglobin 27.4 


 


Mean Corpuscular Hemoglobin


Concent 33.2 


 


 


Red Cell Distribution Width 15.0 


 


Platelet Count 101 


 


Mean Platelet Volume 10.1 


 


Blood Urea Nitrogen 19 


 


Creatinine 1.14 


 


Random Glucose 100 


 


Total Protein 6.6 


 


Albumin 2.6 


 


Calcium Level 8.0 


 


Alkaline Phosphatase 80 


 


Aspartate Amino Transf


(AST/SGOT) 222 


 


 


Alanine Aminotransferase


(ALT/SGPT) 205 


 


 


Total Bilirubin 1.9 


 


Sodium Level 140 


 


Potassium Level 3.3 


 


Chloride Level 106 


 


Carbon Dioxide Level 25.5 


 


Anion Gap 9 


 


Estimat Glomerular Filtration


Rate 79 


 








Lungs:  Clear


Abdomen:  Other (incisional tenderness scant redness jpserosang)





A/P


Assessment and Plan


61yo male s/p open liver resection, stable.transferred from [facility]


  plan


reg diet


oob


pain control


abx


trend labs improving











Shun Geller MD Mar 11, 2018 11:08

## 2018-03-11 NOTE — HHI.PR
Subjective


Remarks


up in chair


tolerated po


some soreness abdomen


states had a BM





Objective


Vitals





Vital Signs








  Date Time  Temp Pulse Resp B/P (MAP) Pulse Ox O2 Delivery O2 Flow Rate FiO2


 


3/11/18 11:39 98.0 96 19 133/89 (104) 97   


 


3/11/18 08:08      Room Air  


 


3/11/18 07:49 99.3 93 19 154/82 (106) 97   


 


3/11/18 04:00 98.6 93 20 155/92 (113) 95   


 


3/11/18 00:00 100.8 90 18 138/91 (107) 95   


 


3/10/18 21:15   18     


 


3/10/18 19:28 100.0 97 18 157/83 (107) 95   


 


3/10/18 19:22      Room Air  


 


3/10/18 16:00 100.0 99 17 156/90 (112) 97   


 


3/10/18 13:33   18     














I/O      


 


 3/10/18 3/10/18 3/10/18 3/11/18 3/11/18 3/11/18





 07:00 15:00 23:00 07:00 15:00 23:00


 


Intake Total 623.3 ml 480 ml 480 ml 480 ml 1150 ml 


 


Output Total 775 ml 60 ml 30 ml 40 ml  


 


Balance -151.7 ml 420 ml 450 ml 440 ml 1150 ml 


 


      


 


Intake Oral 240 ml 480 ml 480 ml 480 ml  


 


IV Total 383.3 ml    1150 ml 


 


Output Urine Total 700 ml     


 


Drainage Total 75 ml 60 ml 30 ml 40 ml  


 


# Voids  6 3 5  


 


# Bowel Movements 0 0 0 0 1 








Result Diagram:  


3/11/18 0612                                                                   

             3/11/18 0612





Imaging





Last Impressions








Chest X-Ray 3/7/18 0000 Signed





Impressions: 





 Service Date/Time:  Wednesday, March 7, 2018 13:57 - CONCLUSION: I did 

position 





 without pneumothorax.      Jericho Anne MD  FACR








Objective Remarks


awake and alert, no acute distress


anicteric


lungs- clear


regular rhythm


abdomen- slightly distended- right quadrant in post op dressing, tube in place


extremities no edema





A/P


Assessment and Plan


62 years old male





s/p Laparotomy, non-anatomic segmentectomy for mass (segments 7/8) 3/7


Metastatic colon adenocarcinoma


- GS ff


- start diet- regular - ADA


- ff LFTs





HTN


- on multiple meds-as OP


- Hold HCTZ and Lisinopril- with increase in creatinine


- borderline BP. Hold amlodpine as well 


- continue on IVF monitor


- continue on coreg for now - increase dose as tolerated





Chronic kidney insufficiency


-non oliguric. FF BMP- creatinine improved


- continue IVF





Hypokalemia


-replace po , recheck in am





Hematuria -- -resolved





Diabetes Mellitus


- SSI AC/HS. for now-  readings- 120-170s


- hold home oral meds- On Januvia and glipizide as OP


- monitor- start diet 








History of SZ/CVA


- continue on Keppra





Advance diet today


Out of bed to chair for meals 


PT daily








CM for DC planning assistance











Rohith Moreno MD Mar 11, 2018 13:17

## 2018-03-12 VITALS
TEMPERATURE: 99.3 F | HEART RATE: 73 BPM | DIASTOLIC BLOOD PRESSURE: 76 MMHG | RESPIRATION RATE: 16 BRPM | OXYGEN SATURATION: 96 % | SYSTOLIC BLOOD PRESSURE: 120 MMHG

## 2018-03-12 VITALS
TEMPERATURE: 98.9 F | HEART RATE: 68 BPM | RESPIRATION RATE: 16 BRPM | OXYGEN SATURATION: 97 % | DIASTOLIC BLOOD PRESSURE: 81 MMHG | SYSTOLIC BLOOD PRESSURE: 131 MMHG

## 2018-03-12 VITALS
OXYGEN SATURATION: 97 % | DIASTOLIC BLOOD PRESSURE: 76 MMHG | TEMPERATURE: 99.7 F | SYSTOLIC BLOOD PRESSURE: 131 MMHG | RESPIRATION RATE: 20 BRPM | HEART RATE: 84 BPM

## 2018-03-12 VITALS
RESPIRATION RATE: 20 BRPM | TEMPERATURE: 99.5 F | SYSTOLIC BLOOD PRESSURE: 117 MMHG | OXYGEN SATURATION: 95 % | HEART RATE: 83 BPM | DIASTOLIC BLOOD PRESSURE: 80 MMHG

## 2018-03-12 VITALS
SYSTOLIC BLOOD PRESSURE: 132 MMHG | RESPIRATION RATE: 18 BRPM | TEMPERATURE: 99.4 F | HEART RATE: 72 BPM | DIASTOLIC BLOOD PRESSURE: 92 MMHG | OXYGEN SATURATION: 95 %

## 2018-03-12 VITALS
RESPIRATION RATE: 17 BRPM | OXYGEN SATURATION: 94 % | SYSTOLIC BLOOD PRESSURE: 138 MMHG | TEMPERATURE: 98.8 F | HEART RATE: 75 BPM | DIASTOLIC BLOOD PRESSURE: 86 MMHG

## 2018-03-12 LAB
BUN SERPL-MCNC: 16 MG/DL (ref 7–18)
CALCIUM SERPL-MCNC: 7.7 MG/DL (ref 8.5–10.1)
CHLORIDE SERPL-SCNC: 110 MEQ/L (ref 98–107)
CREAT SERPL-MCNC: 1.14 MG/DL (ref 0.6–1.3)
GFR SERPLBLD BASED ON 1.73 SQ M-ARVRAT: 79 ML/MIN (ref 89–?)
GLUCOSE SERPL-MCNC: 123 MG/DL (ref 74–106)
HCO3 BLD-SCNC: 26.6 MEQ/L (ref 21–32)
SODIUM SERPL-SCNC: 144 MEQ/L (ref 136–145)

## 2018-03-12 RX ADMIN — HUMAN INSULIN SCH: 100 INJECTION, SOLUTION SUBCUTANEOUS at 12:11

## 2018-03-12 RX ADMIN — Medication SCH ML: at 21:10

## 2018-03-12 RX ADMIN — HUMAN INSULIN SCH: 100 INJECTION, SOLUTION SUBCUTANEOUS at 21:18

## 2018-03-12 RX ADMIN — CARVEDILOL SCH MG: 3.12 TABLET, FILM COATED ORAL at 21:11

## 2018-03-12 RX ADMIN — FAMOTIDINE SCH MG: 10 INJECTION, SOLUTION INTRAVENOUS at 21:11

## 2018-03-12 RX ADMIN — PHENYTOIN SODIUM SCH MLS/HR: 50 INJECTION INTRAMUSCULAR; INTRAVENOUS at 04:18

## 2018-03-12 RX ADMIN — FAMOTIDINE SCH MG: 10 INJECTION, SOLUTION INTRAVENOUS at 08:18

## 2018-03-12 RX ADMIN — CARVEDILOL SCH MG: 3.12 TABLET, FILM COATED ORAL at 08:18

## 2018-03-12 RX ADMIN — HUMAN INSULIN SCH: 100 INJECTION, SOLUTION SUBCUTANEOUS at 08:00

## 2018-03-12 RX ADMIN — ATORVASTATIN CALCIUM SCH MG: 20 TABLET, FILM COATED ORAL at 21:11

## 2018-03-12 RX ADMIN — HUMAN INSULIN SCH: 100 INJECTION, SOLUTION SUBCUTANEOUS at 17:26

## 2018-03-12 RX ADMIN — Medication SCH ML: at 08:18

## 2018-03-12 RX ADMIN — PHENYTOIN SODIUM SCH MLS/HR: 50 INJECTION INTRAMUSCULAR; INTRAVENOUS at 12:12

## 2018-03-12 RX ADMIN — HUMAN INSULIN SCH: 100 INJECTION, SOLUTION SUBCUTANEOUS at 02:44

## 2018-03-12 NOTE — HHI.PR
__________________________________________________





Subjective


Subjective Notes


Up to chair 


Pain better 


RN Faiza at bedside





Objective


Vitals/I&O





Vital Signs








  Date Time  Temp Pulse Resp B/P (MAP) Pulse Ox O2 Delivery O2 Flow Rate FiO2


 


3/12/18 16:00 98.9 68 16 131/81 (98) 97   


 


3/12/18 07:38      Room Air  


 


3/9/18 20:00       2.00 








Labs





Laboratory Tests








Test


  3/12/18


04:46


 


Blood Urea Nitrogen 16 


 


Creatinine 1.14 


 


Random Glucose 123 


 


Calcium Level 7.7 


 


Sodium Level 144 


 


Potassium Level 3.3 


 


Chloride Level 110 


 


Carbon Dioxide Level 26.6 


 


Anion Gap 7 


 


Estimat Glomerular Filtration


Rate 79 


 








Cardiovascular:  Regular


Lungs:  Clear


Abdomen:  Other (PEDRITO drain in place with minimal output; Incision with mild 

redness otherwise clean and dry )


Extremities:  No edema





A/P


Assessment and Plan


62 year old male POD5 liver resection; cholecystectomy for metastatic 

adenocarcinoma 





-Regular diet 


-DC PCA; added PO pain meds 


-DC IVF


-Monitor labs --liver enzymes trending down 


-OOB and mobilize as tolerated


-IS





Attending Statement


The exam, history, and the medical decision-making described in the above note 

were completed with the assistance of the mid-level provider. I reviewed and 

agree with the findings presented.  I attest that I had a face-to-face 

encounter with the patient on the same day, and personally performed and 

documented my assessment and findings in the medical record.





patient s/p liver resection, stable condition





pain controlled





no new issues





abdomen soft, non-distended, incision c/d/i





will work toward DC home once eating better and +BM











Pamela Rainey ARNP/First Assist ARNP Mar 12, 2018 16:39


De Edwards MD Mar 12, 2018 23:55

## 2018-03-12 NOTE — HHI.PR
Subjective


Remarks


awake and alert, no pain complains


had  lunch - "but not much", no nausea or vomiting or abdominal pain


states had a BM last evening





Objective


Vitals





Vital Signs








  Date Time  Temp Pulse Resp B/P (MAP) Pulse Ox O2 Delivery O2 Flow Rate FiO2


 


3/12/18 12:00 99.3 73 16 120/76 (91) 96   


 


3/12/18 08:00 98.8 75 17 138/86 (103) 94   


 


3/12/18 07:38      Room Air  


 


3/12/18 06:08   18     


 


3/12/18 04:00 99.5 83 20 117/80 (92) 95   


 


3/12/18 00:00 99.7 84 20 131/76 (94) 97   


 


3/11/18 21:22   18     


 


3/11/18 20:00 99.7 88 22 127/88 (101) 97   


 


3/11/18 14:54 98.5 91 18 135/86 (102) 97   














I/O      


 


 3/11/18 3/11/18 3/11/18 3/12/18 3/12/18 3/12/18





 07:00 15:00 23:00 07:00 15:00 23:00


 


Intake Total 480 ml 1150 ml 100 ml 200 ml  


 


Output Total 40 ml  90 ml 20 ml  


 


Balance 440 ml 1150 ml 10 ml 180 ml  


 


      


 


Intake Oral 480 ml     


 


IV Total  1150 ml 100 ml 200 ml  


 


Drainage Total 40 ml  90 ml 20 ml  


 


# Voids 5     


 


# Bowel Movements 0 1    








Result Diagram:  


3/11/18 0612                                                                   

             3/12/18 0446





Imaging





Last Impressions








Chest X-Ray 3/7/18 0000 Signed





Impressions: 





 Service Date/Time:  Wednesday, March 7, 2018 13:57 - CONCLUSION: I did 

position 





 without pneumothorax.      Jericho Anne MD  FACR








Objective Remarks


awake and alert, no acute distress


anicteric


lungs- clear


regular rhythm


abdomen- slightly distended- right quadrant in post op dressing, tube in place


extremities no edema


Urinary Catheter:  Yes


Assessment to:  Continue


Garcia insert reason:  Measure Accurate Output





A/P


Assessment and Plan


62 years old male





s/p Laparotomy, non-anatomic segmentectomy for mass (segments 7/8) 3/7


Metastatic colon adenocarcinoma


Elevated LFTs- mets 


- GS ff


- start diet- regular - ADA- tolerating well


- ff LFTs- trending down- recheck in am





HTN


- on multiple meds-as OP


- HCTZ and Lisinopril-  held with increase in creatinine


- borderline BP. Hold amlodpine as well 


- good readings on coreg alone 





Chronic kidney insufficiency


-non oliguric. FF BMP- creatinine improved


- continue IVF





Hypokalemia


-replace po 50 meq po now


recheck in am





Hematuria -- -resolved





Diabetes Mellitus


- SSI AC/HS. for now-  readings- 120-170s


- hold home oral meds- On Januvia and glipizide as OP


- monitor- start diet 








History of SZ/CVA


- continue on Keppra








Out of bed to chair for meals 


PT daily








CM for DC planning assistance











Rohith Moreno MD Mar 12, 2018 14:10

## 2018-03-13 VITALS
SYSTOLIC BLOOD PRESSURE: 140 MMHG | HEART RATE: 74 BPM | TEMPERATURE: 98.4 F | OXYGEN SATURATION: 98 % | RESPIRATION RATE: 17 BRPM | DIASTOLIC BLOOD PRESSURE: 95 MMHG

## 2018-03-13 VITALS
OXYGEN SATURATION: 96 % | DIASTOLIC BLOOD PRESSURE: 88 MMHG | RESPIRATION RATE: 18 BRPM | HEART RATE: 80 BPM | TEMPERATURE: 99.9 F | SYSTOLIC BLOOD PRESSURE: 130 MMHG

## 2018-03-13 VITALS
HEART RATE: 85 BPM | SYSTOLIC BLOOD PRESSURE: 140 MMHG | OXYGEN SATURATION: 97 % | RESPIRATION RATE: 18 BRPM | TEMPERATURE: 98.1 F | DIASTOLIC BLOOD PRESSURE: 97 MMHG

## 2018-03-13 VITALS
OXYGEN SATURATION: 96 % | RESPIRATION RATE: 18 BRPM | SYSTOLIC BLOOD PRESSURE: 105 MMHG | TEMPERATURE: 98.5 F | HEART RATE: 66 BPM | DIASTOLIC BLOOD PRESSURE: 78 MMHG

## 2018-03-13 VITALS
OXYGEN SATURATION: 96 % | SYSTOLIC BLOOD PRESSURE: 121 MMHG | RESPIRATION RATE: 18 BRPM | DIASTOLIC BLOOD PRESSURE: 90 MMHG | TEMPERATURE: 98.4 F | HEART RATE: 80 BPM

## 2018-03-13 LAB
ALBUMIN SERPL-MCNC: 2.2 GM/DL (ref 3.4–5)
ALP SERPL-CCNC: 74 U/L (ref 45–117)
ALT SERPL-CCNC: 97 U/L (ref 12–78)
AST SERPL-CCNC: 92 U/L (ref 15–37)
BILIRUB INDIRECT SERPL-MCNC: 0.5 MG/DL (ref 0–0.8)
BILIRUB SERPL-MCNC: 1.1 MG/DL (ref 0.2–1)
BUN SERPL-MCNC: 13 MG/DL (ref 7–18)
CALCIUM SERPL-MCNC: 7.9 MG/DL (ref 8.5–10.1)
CHLORIDE SERPL-SCNC: 109 MEQ/L (ref 98–107)
CREAT SERPL-MCNC: 0.95 MG/DL (ref 0.6–1.3)
DIRECT BILIRUBIN ADULT: 0.6 MG/DL (ref 0–0.2)
GFR SERPLBLD BASED ON 1.73 SQ M-ARVRAT: 98 ML/MIN (ref 89–?)
GLUCOSE SERPL-MCNC: 110 MG/DL (ref 74–106)
HCO3 BLD-SCNC: 25 MEQ/L (ref 21–32)
PROT SERPL-MCNC: 5.7 GM/DL (ref 6.4–8.2)
SODIUM SERPL-SCNC: 144 MEQ/L (ref 136–145)

## 2018-03-13 RX ADMIN — FAMOTIDINE SCH MG: 10 INJECTION, SOLUTION INTRAVENOUS at 08:59

## 2018-03-13 RX ADMIN — Medication SCH ML: at 19:51

## 2018-03-13 RX ADMIN — HUMAN INSULIN SCH: 100 INJECTION, SOLUTION SUBCUTANEOUS at 21:43

## 2018-03-13 RX ADMIN — HUMAN INSULIN SCH: 100 INJECTION, SOLUTION SUBCUTANEOUS at 08:58

## 2018-03-13 RX ADMIN — HUMAN INSULIN SCH: 100 INJECTION, SOLUTION SUBCUTANEOUS at 11:33

## 2018-03-13 RX ADMIN — CARVEDILOL SCH MG: 3.12 TABLET, FILM COATED ORAL at 21:44

## 2018-03-13 RX ADMIN — HUMAN INSULIN SCH: 100 INJECTION, SOLUTION SUBCUTANEOUS at 17:00

## 2018-03-13 RX ADMIN — ATORVASTATIN CALCIUM SCH MG: 20 TABLET, FILM COATED ORAL at 21:44

## 2018-03-13 RX ADMIN — Medication SCH ML: at 08:48

## 2018-03-13 RX ADMIN — CARVEDILOL SCH MG: 3.12 TABLET, FILM COATED ORAL at 08:47

## 2018-03-13 RX ADMIN — FAMOTIDINE SCH MG: 10 INJECTION, SOLUTION INTRAVENOUS at 21:44

## 2018-03-13 RX ADMIN — HUMAN INSULIN SCH: 100 INJECTION, SOLUTION SUBCUTANEOUS at 03:00

## 2018-03-13 NOTE — HHI.FF
Face to Face Verification


Diagnosis:  


(1) Liver mass


Physical Therapy


Order:  Evaluate and Treat, Improve ambulation, Strength and gait training


Instructions:


No restrictions





Home Health Nursing








Order: Wound care and dressing changes





 Nursing assessment with vital signs

















I have seen patient Jagjit VenturaJr on 3/13/18. My clinical findings 

support the need for the requested home health care services because:








 Limited ability to care for self





 High risk of falls














I certify that my clinical findings support that this patient is homebound 

because:








 Post-op weakness

















Pamela Rainey/First Assist ARNP Mar 13, 2018 08:48

## 2018-03-13 NOTE — HHI.PR
__________________________________________________





Subjective


Subjective Notes


Sitting on the side of the bed 


Pain continues to improve 





"I'm feeling pretty good today."





Objective


Vitals/I&O





Vital Signs








  Date Time  Temp Pulse Resp B/P (MAP) Pulse Ox O2 Delivery O2 Flow Rate FiO2


 


3/13/18 08:00 98.1 85 18 140/97 (111) 97   


 


3/12/18 20:00      Room Air  


 


3/9/18 20:00       2.00 








Labs





Laboratory Tests








Test


  3/13/18


04:10


 


Blood Urea Nitrogen 13 


 


Creatinine 0.95 


 


Random Glucose 110 


 


Total Protein 5.7 


 


Albumin 2.2 


 


Calcium Level 7.9 


 


Alkaline Phosphatase 74 


 


Aspartate Amino Transf


(AST/SGOT) 92 


 


 


Alanine Aminotransferase


(ALT/SGPT) 97 


 


 


Total Bilirubin 1.1 


 


Direct Bilirubin 0.6 


 


Sodium Level 144 


 


Potassium Level 3.4 


 


Chloride Level 109 


 


Carbon Dioxide Level 25.0 


 


Anion Gap 10 


 


Estimat Glomerular Filtration


Rate 98 


 


 


Indirect Bilirubin 0.5 








Cardiovascular:  Regular


Lungs:  Clear


Abdomen:  Other (incision BARB; minimal tenderness; mildly distended )


Extremities:  No edema





A/P


Assessment and Plan


62 year old male POD6 liver resection; cholecystectomy for metastatic 

adenocarcinoma 





-Regular diet 


-Bowel regimen 


-Continue PO pain medications PRN 


-OOB and mobilize as tolerated


-IS


-CM consult for front wheeled walker and [facility] for nursing and PT 


-Likely DC tomorrow or Thursday





Attending Statement








The exam, history, and the medical decision-making described in the above note 

were completed with the assistance of the mid-level provider. I reviewed and 

agree with the findings presented.  I attest that I had a face-to-face 

encounter with the patient on the same day, and personally performed and 

documented my assessment and findings in the medical record.





pain better today, tolerating PO, still weak from surgery





continue PT











Pamela Rainey/First Assist ARNP Mar 13, 2018 12:21


De Edwards MD Mar 14, 2018 15:41

## 2018-03-14 VITALS
RESPIRATION RATE: 18 BRPM | HEART RATE: 79 BPM | SYSTOLIC BLOOD PRESSURE: 119 MMHG | OXYGEN SATURATION: 96 % | TEMPERATURE: 99 F | DIASTOLIC BLOOD PRESSURE: 79 MMHG

## 2018-03-14 VITALS
TEMPERATURE: 98.7 F | RESPIRATION RATE: 18 BRPM | HEART RATE: 80 BPM | SYSTOLIC BLOOD PRESSURE: 119 MMHG | DIASTOLIC BLOOD PRESSURE: 84 MMHG | OXYGEN SATURATION: 95 %

## 2018-03-14 VITALS
HEART RATE: 78 BPM | SYSTOLIC BLOOD PRESSURE: 116 MMHG | RESPIRATION RATE: 18 BRPM | OXYGEN SATURATION: 96 % | TEMPERATURE: 98.6 F | DIASTOLIC BLOOD PRESSURE: 83 MMHG

## 2018-03-14 VITALS
HEART RATE: 82 BPM | OXYGEN SATURATION: 96 % | TEMPERATURE: 99.8 F | DIASTOLIC BLOOD PRESSURE: 76 MMHG | SYSTOLIC BLOOD PRESSURE: 117 MMHG | RESPIRATION RATE: 17 BRPM

## 2018-03-14 LAB
ALBUMIN SERPL-MCNC: 2.5 GM/DL (ref 3.4–5)
ALP SERPL-CCNC: 88 U/L (ref 45–117)
ALT SERPL-CCNC: 83 U/L (ref 12–78)
AST SERPL-CCNC: 74 U/L (ref 15–37)
BILIRUB SERPL-MCNC: 1 MG/DL (ref 0.2–1)
BUN SERPL-MCNC: 12 MG/DL (ref 7–18)
CALCIUM SERPL-MCNC: 7.8 MG/DL (ref 8.5–10.1)
CHLORIDE SERPL-SCNC: 109 MEQ/L (ref 98–107)
CREAT SERPL-MCNC: 1.11 MG/DL (ref 0.6–1.3)
GFR SERPLBLD BASED ON 1.73 SQ M-ARVRAT: 82 ML/MIN (ref 89–?)
GLUCOSE SERPL-MCNC: 125 MG/DL (ref 74–106)
HCO3 BLD-SCNC: 29 MEQ/L (ref 21–32)
PROT SERPL-MCNC: 6.3 GM/DL (ref 6.4–8.2)
SODIUM SERPL-SCNC: 143 MEQ/L (ref 136–145)

## 2018-03-14 RX ADMIN — HUMAN INSULIN SCH: 100 INJECTION, SOLUTION SUBCUTANEOUS at 08:00

## 2018-03-14 RX ADMIN — FAMOTIDINE SCH MG: 10 INJECTION, SOLUTION INTRAVENOUS at 08:34

## 2018-03-14 RX ADMIN — Medication SCH ML: at 08:34

## 2018-03-14 RX ADMIN — CARVEDILOL SCH MG: 3.12 TABLET, FILM COATED ORAL at 08:34

## 2018-03-14 RX ADMIN — HUMAN INSULIN SCH: 100 INJECTION, SOLUTION SUBCUTANEOUS at 03:00

## 2018-03-14 RX ADMIN — HUMAN INSULIN SCH: 100 INJECTION, SOLUTION SUBCUTANEOUS at 11:46
